# Patient Record
Sex: FEMALE | Race: OTHER | HISPANIC OR LATINO | Employment: FULL TIME | URBAN - METROPOLITAN AREA
[De-identification: names, ages, dates, MRNs, and addresses within clinical notes are randomized per-mention and may not be internally consistent; named-entity substitution may affect disease eponyms.]

---

## 2019-01-22 ENCOUNTER — APPOINTMENT (EMERGENCY)
Dept: RADIOLOGY | Facility: HOSPITAL | Age: 54
End: 2019-01-22
Payer: COMMERCIAL

## 2019-01-22 ENCOUNTER — HOSPITAL ENCOUNTER (EMERGENCY)
Facility: HOSPITAL | Age: 54
Discharge: HOME | End: 2019-01-22
Attending: EMERGENCY MEDICINE | Admitting: EMERGENCY MEDICINE
Payer: COMMERCIAL

## 2019-01-22 ENCOUNTER — APPOINTMENT (EMERGENCY)
Dept: ULTRASOUND IMAGING | Facility: HOSPITAL | Age: 54
End: 2019-01-22
Payer: COMMERCIAL

## 2019-01-22 VITALS
OXYGEN SATURATION: 95 % | SYSTOLIC BLOOD PRESSURE: 199 MMHG | DIASTOLIC BLOOD PRESSURE: 104 MMHG | HEART RATE: 74 BPM | TEMPERATURE: 97.8 F | RESPIRATION RATE: 18 BRPM

## 2019-01-22 DIAGNOSIS — I80.9 SUPERFICIAL THROMBOPHLEBITIS: Primary | ICD-10-CM

## 2019-01-22 PROCEDURE — 76882 US LMTD JT/FCL EVL NVASC XTR: CPT

## 2019-01-22 PROCEDURE — 73610 X-RAY EXAM OF ANKLE: CPT

## 2019-01-22 PROCEDURE — 99284 EMERGENCY DEPT VISIT MOD MDM: CPT

## 2019-01-22 RX ORDER — ASPIRIN 81 MG/1
81 TABLET, CHEWABLE ORAL DAILY
Qty: 30 TABLET | Refills: 0 | Status: SHIPPED | OUTPATIENT
Start: 2019-01-22

## 2019-01-22 RX ORDER — COLCHICINE 0.6 MG/1
1.2 TABLET ORAL ONCE
Status: DISCONTINUED | OUTPATIENT
Start: 2019-01-22 | End: 2019-01-22

## 2019-01-22 RX ORDER — COLCHICINE 0.6 MG/1
0.6 TABLET ORAL ONCE
Status: DISCONTINUED | OUTPATIENT
Start: 2019-01-22 | End: 2019-01-22

## 2019-01-23 NOTE — ED PROVIDER NOTES
History  Chief Complaint   Patient presents with    Leg Pain     Patient c/o right lower leg pain x 2 weeks and increased swelling since this morning, denies injury  Denies hx of DVT  Lee Mcmanus is a 48 y o  female w PMH anxiety, DM, HTN who presents for evaluation of leg pain  Patient complains of pain in the right ankle  BNP few days ago in the foot but now has worsened and spread upward slightly to the ankle medial aspect  She feels a nodule there that is acutely tender  She has no history of DVT/PE, no recent travel, trauma, surgery, no malignancy  Has slight swelling in the ankle  No recent trauma or injury  No numbness or paresthesias  Has trouble walking on a because of painful it is  No history of gout  None       Past Medical History:   Diagnosis Date    Anxiety     Diabetes mellitus ([facility] [address] )     Hypertension        Past Surgical History:   Procedure Laterality Date    CHOLECYSTECTOMY      GASTRIC BYPASS      HERNIA REPAIR      TUBAL LIGATION         History reviewed  No pertinent family history  I have reviewed and agree with the history as documented  Social History   Substance Use Topics    Smoking status: Never Smoker    Smokeless tobacco: Never Used    Alcohol use Yes      Comment: occ        Review of Systems   Constitutional: Negative for chills, diaphoresis and fever  HENT: Negative for congestion and sore throat  Eyes: Negative for visual disturbance  Respiratory: Negative for cough, chest tightness, shortness of breath and wheezing  Cardiovascular: Negative for chest pain and leg swelling  Gastrointestinal: Negative for abdominal pain, constipation, diarrhea, nausea and vomiting  Genitourinary: Negative for difficulty urinating, dysuria, frequency, hematuria, urgency, vaginal bleeding, vaginal discharge and vaginal pain  Musculoskeletal: Positive for myalgias  Negative for arthralgias     Neurological: Negative for dizziness, weakness, light-headedness, numbness and headaches  Psychiatric/Behavioral: The patient is not nervous/anxious  Physical Exam  Physical Exam   Constitutional: She is oriented to person, place, and time  She appears well-developed and well-nourished  No distress  HENT:   Head: Normocephalic and atraumatic  Eyes: Pupils are equal, round, and reactive to light  Neck: Normal range of motion  Neck supple  No tracheal deviation present  Cardiovascular: Normal rate, regular rhythm, normal heart sounds and intact distal pulses  Exam reveals no gallop and no friction rub  No murmur heard  Pulmonary/Chest: Effort normal and breath sounds normal  No respiratory distress  She has no wheezes  She has no rales  Abdominal: Soft  Bowel sounds are normal  She exhibits no distension and no mass  There is no tenderness  There is no guarding  Musculoskeletal: She exhibits no edema or deformity  Neurological: She is alert and oriented to person, place, and time  No cranial nerve deficit  Coordination normal    Skin: Skin is warm and dry  She is not diaphoretic  Patient has pain to palpation to the medial ankle which is slightly erythematous  She does have a nodular that is about 1 cm that is tender to palpation  She has no significant edema, edema is very mild  This does not look consistent with cellulitis  There is no abscess  No pain to palpation in the calf along the deep veins  This nodule and pain she does have is more anterior medial    Psychiatric: She has a normal mood and affect  Her behavior is normal    Nursing note and vitals reviewed        Vital Signs  ED Triage Vitals [01/22/19 1942]   Temperature Pulse Respirations Blood Pressure SpO2   97 8 °F (36 6 °C) 74 18 (!) 199/104 95 %      Temp Source Heart Rate Source Patient Position - Orthostatic VS BP Location FiO2 (%)   Oral Monitor Lying Right arm --      Pain Score       --           Vitals:    01/22/19 1942   BP: (!) 199/104   Pulse: 74   Patient Position - Orthostatic VS: Lying       Visual Acuity      ED Medications  Medications - No data to display    Diagnostic Studies  Results Reviewed     None                 US extremity soft tissue   Final Result by Rebekah Dukes MD (01/22 2306)      Findings consistent with thrombophlebitis at the right medial ankle  Workstation performed: [id number]         XR ankle 3+ views RIGHT   ED Interpretation by Marissa Arias PA-C (01/22 2224)   No acute abnormality                  Procedures  Procedures       Phone Contacts  ED Phone Contact    ED Course                               MDM  Number of Diagnoses or Management Options  Superficial thrombophlebitis:   Diagnosis management comments: DDX includes but not ltd to:   Does not look consistent with cellulitis  Consider about although she does not have history of this  Do not suspect this is acute osseous abnormality  Consider superficial thrombophlebitis, doubt that this is DVT has no pain in the deep venous regions  Doubt that this is arterial vascular insufficiency  Plan is to obtain:  XR of affected joint(s) for acute osseous abnormality   US superficial veins     Based on results:  Superficial thrombophlebitis that will treat this baby aspirin for months and she can follow up with PCP  Advise warm compresses and can use Voltaren cream     Return parameters discussed  Pt requires f/u as an outpt  Pt expresses understanding w above treatment plan  All questions answered prior to d/c  Portions of the record may have been created with voice recognition software   Occasional wrong word or "sound a like" substitutions may have occurred due to the inherent limitations of voice recognition software   Read the chart carefully and recognize, using context, where substitutions have occurred        CritCare Time    Disposition  Final diagnoses:   Superficial thrombophlebitis     Time reflects when diagnosis was documented in both MDM as applicable and the Disposition within this note     Time User Action Codes Description Comment    1/22/2019 10:26 PM Tova Mccoy Add [M25 571] Right ankle pain     1/22/2019 10:57 PM Tova Mccoy Remove [M25 571] Right ankle pain     1/22/2019 10:58 PM Tova Mccoy Add [I80 9] Superficial thrombophlebitis       ED Disposition     ED Disposition Condition Comment    Discharge  Gary Rivas discharge to home/self care  Condition at discharge: Good        Follow-up Information     Follow up With Specialties Details Why Contact Info Additional Information    [address] [facility] Emergency [facility]  If symptoms worsen [address] [address] ED, [address] [place], [place], [address]          Discharge Medication List as of 1/22/2019 11:03 PM      START taking these medications    Details   aspirin 81 mg chewable tablet Chew 1 tablet (81 mg total) daily, Starting Tue 1/22/2019, Print      diclofenac sodium (VOLTAREN) 1 % Apply 2 g topically 4 (four) times a day for 30 days, Starting Tue 1/22/2019, Until Thu 2/21/2019, Print           No discharge procedures on file      ED Provider  Electronically Signed by           Sara Warren PA-C  01/23/19 8663

## 2019-01-23 NOTE — DISCHARGE INSTRUCTIONS
Superficial Thrombophlebitis   WHAT YOU NEED TO KNOW:   Superficial thrombophlebitis (STP) is inflammation of a vein just under your skin (superficial vein)  The inflammation causes a blood clot to form in your vein  STP most often happens in your leg but may also happen in your arm  DISCHARGE INSTRUCTIONS:   Call 911 for any of the following:   · You feel lightheaded, short of breath, and have chest pain  · You cough up blood  Return to the emergency department if:   · Your leg or arm turns pale or blue  · Your leg or arm feels hot or cold  · Your arm or leg feels warm, tender, and painful  It may look swollen and red  Contact your healthcare provider or hematologist if:   · Your symptoms return after treatment  · You have questions or concerns about your condition or care  Medicines: You may  need any of the following:  · Antibiotics  may be given to treat a bacterial infection  · NSAIDs , such as ibuprofen, help decrease swelling, pain, and fever  NSAIDs can cause stomach bleeding or kidney problems in certain people  If you take blood thinner medicine, always ask your healthcare provider if NSAIDs are safe for you  Always read the medicine label and follow directions  · Blood thinners    help prevent blood clots  Examples of blood thinners include heparin and warfarin  Clots can cause strokes, heart attacks, and death  The following are general safety guidelines to follow while you are taking a blood thinner:    ¨ Watch for bleeding and bruising while you take blood thinners  Watch for bleeding from your gums or nose  Watch for blood in your urine and bowel movements  Use a soft washcloth on your skin, and a soft toothbrush to brush your teeth  This can keep your skin and gums from bleeding  If you shave, use an electric shaver  Do not play contact sports  ¨ Tell your dentist and other healthcare providers that you take anticoagulants   Wear a bracelet or necklace that says you take this medicine  ¨ Do not start or stop any medicines unless your healthcare provider tells you to  Many medicines cannot be used with blood thinners  ¨ Tell your healthcare provider right away if you forget to take the medicine, or if you take too much  ¨ Warfarin  is a blood thinner that you may need to take  The following are things you should be aware of if you take warfarin  § Foods and medicines can affect the amount of warfarin in your blood  Do not make major changes to your diet while you take warfarin  Warfarin works best when you eat about the same amount of vitamin K every day  Vitamin K is found in green leafy vegetables and certain other foods  Ask for more information about what to eat when you are taking warfarin  § You will need to see your healthcare provider for follow-up visits when you are on warfarin  You will need regular blood tests  These tests are used to decide how much medicine you need  · Antiplatelets , such as aspirin, help prevent blood clots  Take your antiplatelet medicine exactly as directed  These medicines make it more likely for you to bleed or bruise  If you are told to take aspirin, do not take acetaminophen or ibuprofen instead  · Take your medicine as directed  Contact your healthcare provider if you think your medicine is not helping or if you have side effects  Tell him of her if you are allergic to any medicine  Keep a list of the medicines, vitamins, and herbs you take  Include the amounts, and when and why you take them  Bring the list or the pill bottles to follow-up visits  Carry your medicine list with you in case of an emergency  Follow up with your healthcare provider as directed:  Write down your questions so you remember to ask them during your visits  Manage your symptoms and prevent STP:  STP can increase your risk for a blood clot in deeper veins in your arms or legs  It can also increase your risk for a blood clot in your lungs  Do the following to decrease your risk for more blood clots and manage your symptoms:  · Wear pressure stockings as directed  Pressure stockings improve blood flow and help prevent clots in your legs  Wear the stockings during the day  Do not wear them when you sleep  · Elevate your leg or arm above the level of your heart as often as you can  This will help decrease swelling and pain  Prop your leg or arm on pillows or blankets to keep it elevated comfortably  · Apply a warm compress to your arm or leg  This will help decrease swelling and pain  Wet a washcloth in warm water  Do not  use hot water  Apply the warm compress for 10 minutes  Repeat this 4 times each day  · Maintain a healthy weight  This will help decrease your risk for another blood clot  Ask your healthcare provider how much you should weigh  Ask him or her to help you create a weight loss plan if you are overweight  · Do not smoke  Nicotine and other chemicals in cigarettes and cigars can damage blood vessels and increase your risk for blood clots  Ask your healthcare provider for information if you currently smoke and need help to quit  E-cigarettes or smokeless tobacco still contain nicotine  Talk to your healthcare provider before you use these products  · Stay active  Activity helps prevent blood clots  Do not sit for more than an hour  If you travel by car or work at a desk, move and stretch in your seat several times each hour  In an airplane, get up and walk every hour  Exercise your legs while you are sitting by raising and lowering your heels  Keep your toes on the floor while you do this  You can also raise and lower your toes while keeping your heels on the floor  Also tighten and release your leg muscles while you are sitting  · Exercise regularly  Exercise can help increase your blood flow and prevent a blood clot  Walking is a good low-impact exercise   Talk to your healthcare provider about the best exercise plan for you  · Do not inject illegal drugs  Talk to your healthcare provider if you use IV drugs and need help to quit  © 2017 [employer] Information is for End User's use only and may not be sold, redistributed or otherwise used for commercial purposes  All illustrations and images included in CareNotes® are the copyrighted property of A [employer] M [employer]  or [employer]  The above information is an  only  It is not intended as medical advice for individual conditions or treatments  Talk to your doctor, nurse or pharmacist before following any medical regimen to see if it is safe and effective for you

## 2019-01-29 NOTE — PROGRESS NOTES
Assessment/Plan: Thrombophlebitis of superficial veins of right lower extremity  Right lower extremity superficial thrombophlebitis  -50yo female PMH DM, HTN, varicose veins presented to ED with palpable lump to the right medial ankle; soft tissue U/S +for superficial thrombophlebitis    -continue with compression daily during waking hours  -continue with elevation throughout the day  -continue with warm compress as needed  -Ibuprofen/NSAIDs for pain;  -can continue daily aspirin and voltaren cream to site  -continue with daily activities and walking as tolerated    Discussed superficial thrombophlebitis with the patient and s/s of DVT/PE and when to return to ER or our office for evaluation, she expressed understanding  Will see PRN  Varicose veins of both lower extremities  Bilateral lower extremity varicose veins  -patient denies any symptoms at this time  -she does have a SVT to the right medial ankle, see plan as noted above    We discussed the pathophysiology of varicose veins and venous insufficiency  Patient expressed understanding  Continue conservative measures to aid in symptom relief and progression of disease  PLAN:  -compression stockings 20-30mmHg to be worn during waking hours  -elevate legs throughout the day as able  -exercise daily as tolerated  -continue weight loss and low-fat low-chol diet  -return for f/u PRN if symptoms progress to discuss further treatment options       Diagnoses and all orders for this visit:    Thrombophlebitis of superficial veins of right lower extremity    Varicose veins of both lower extremities, unspecified whether complicated          Subjective:      Patient ID: Sofia Hernandez is a 48 y o  female  Pt is new to our practice and is here for a vein evaluation  Pt was seen in the ED on 1/21/19 and diagnosed with Thrombophlebitis  Pt has pain, a lump and discoloration to her lower right leg    Pt does wear compression stocking and elevates as much as possible  Pt has had no recent testing  Pt is currently taking ASA  Brandi Martin is a 50yo female with PMH of HTN, anxiety, DM, varicose veins, gastric bypass surgery, and obesity  She was referred to our office by Dr Kaylynn Cabrales for evaluation of her superficial thrombophlebitis and varicose veins  Patient reports a palpable lump to the right medial ankle x 1 week  She went to the [facility] ER where they did an Xray and U/S of the soft tissue and found a clot to a superficial vein  She was started on aspirin and voltaren cream for this, as well as compression stockings  She has been wearing compression stockings for several days with good relief of pain to the foot  She is also elevating the leg at night and using a warm compress when able to  She is not taking NSAIDs at this time for the pain  She denies any heaviness, tiredness, aching to the legs, denies itching and burning  She states the area where the clot is hurts when she pushes on it but otherwise she denies any pain at this time  She is a non-smoker  No history of DVT/PE  She does have a FH of blood clots, unsure if DVT or SVT  The following portions of the patient's history were reviewed and updated as appropriate: allergies, current medications, past family history, past medical history, past social history, past surgical history and problem list     Review of Systems   Constitutional: Negative  Negative for chills and fever  HENT: Negative  Eyes: Negative  Respiratory: Negative  Negative for shortness of breath  Cardiovascular: Positive for leg swelling (Right foot)  Negative for chest pain  Gastrointestinal: Negative  Endocrine: Negative  Genitourinary: Negative  Musculoskeletal: Negative  Skin: Positive for color change  Negative for wound  Allergic/Immunologic: Positive for environmental allergies and food allergies  Neurological: Negative  Hematological: Bruises/bleeds easily  Psychiatric/Behavioral: Negative  ROS reviewed and updated  Objective:      BP (!) 182/106 (BP Location: Right arm, Patient Position: Sitting, Cuff Size: Adult)   Pulse 76   Temp 97 6 °F (36 4 °C) (Tympanic)   Resp 16   Ht 5' 3" (1 6 m)   Wt 84 4 kg (186 lb)   BMI 32 95 kg/m²          Physical Exam   Constitutional: She is oriented to person, place, and time  She appears well-developed and well-nourished  HENT:   Head: Normocephalic and atraumatic  Eyes: Pupils are equal, round, and reactive to light  EOM are normal  No scleral icterus  Neck: Normal range of motion  Cardiovascular: Normal rate, regular rhythm, normal heart sounds and intact distal pulses  Pulmonary/Chest: Effort normal and breath sounds normal    Abdominal: Soft  Bowel sounds are normal    Musculoskeletal: Normal range of motion  Neurological: She is alert and oriented to person, place, and time  She has normal strength  Skin: Skin is warm, dry and intact  Bruising noted  Bulging veins to upper thighs  Spider/reticular veins to the legs bilaterally   Psychiatric: She has a normal mood and affect  Her speech is normal and behavior is normal  Judgment and thought content normal  Cognition and memory are normal    Nursing note and vitals reviewed  Vitals:    01/30/19 1045   BP: (!) 182/106   BP Location: Right arm   Patient Position: Sitting   Cuff Size: Adult   Pulse: 76   Resp: 16   Temp: 97 6 °F (36 4 °C)   TempSrc: Tympanic   Weight: 84 4 kg (186 lb)   Height: 5' 3" (1 6 m)       Patient Active Problem List   Diagnosis    Thrombophlebitis of superficial veins of right lower extremity    Varicose veins of both lower extremities       Past Surgical History:   Procedure Laterality Date    CHOLECYSTECTOMY      GASTRIC BYPASS      HERNIA REPAIR      TUBAL LIGATION         History reviewed  No pertinent family history      Social History     Social History    Marital status: Single     Spouse name: N/A  Number of children: N/A    Years of education: N/A     Occupational History    Not on file       Social History Main Topics    Smoking status: Never Smoker    Smokeless tobacco: Never Used    Alcohol use Yes      Comment: occ    Drug use: No    Sexual activity: Not on file     Other Topics Concern    Not on file     Social History Narrative    No narrative on file       Allergies   Allergen Reactions    Anesthesia S-I-40 [Propofol]          Current Outpatient Prescriptions:     aspirin 81 mg chewable tablet, Chew 1 tablet (81 mg total) daily, Disp: 30 tablet, Rfl: 0    diclofenac sodium (VOLTAREN) 1 %, Apply 2 g topically 4 (four) times a day for 30 days, Disp: 1 Tube, Rfl: 0

## 2019-01-30 ENCOUNTER — CONSULT (OUTPATIENT)
Dept: VASCULAR SURGERY | Facility: CLINIC | Age: 54
End: 2019-01-30
Payer: COMMERCIAL

## 2019-01-30 VITALS
RESPIRATION RATE: 16 BRPM | TEMPERATURE: 97.6 F | SYSTOLIC BLOOD PRESSURE: 182 MMHG | BODY MASS INDEX: 32.96 KG/M2 | HEIGHT: 63 IN | DIASTOLIC BLOOD PRESSURE: 106 MMHG | WEIGHT: 186 LBS | HEART RATE: 76 BPM

## 2019-01-30 DIAGNOSIS — I83.93 VARICOSE VEINS OF BOTH LOWER EXTREMITIES, UNSPECIFIED WHETHER COMPLICATED: ICD-10-CM

## 2019-01-30 DIAGNOSIS — I80.01 THROMBOPHLEBITIS OF SUPERFICIAL VEINS OF RIGHT LOWER EXTREMITY: Primary | ICD-10-CM

## 2019-01-30 PROCEDURE — 99243 OFF/OP CNSLTJ NEW/EST LOW 30: CPT | Performed by: NURSE PRACTITIONER

## 2019-01-30 NOTE — ASSESSMENT & PLAN NOTE
Bilateral lower extremity varicose veins  -patient denies any symptoms at this time  -she does have a SVT to the right medial ankle, see plan as noted above    We discussed the pathophysiology of varicose veins and venous insufficiency  Patient expressed understanding  Continue conservative measures to aid in symptom relief and progression of disease      PLAN:  -compression stockings 20-30mmHg to be worn during waking hours  -elevate legs throughout the day as able  -exercise daily as tolerated  -continue weight loss and low-fat low-chol diet  -return for f/u PRN if symptoms progress to discuss further treatment options

## 2019-01-30 NOTE — LETTER
January 30, 2019     Patient: Lio Warner   YOB: 1965   Date of Visit: 1/30/2019       To Whom it May Concern:    Lio Warner is under my professional care  She was seen in my office on 1/30/2019  She may return to work on 1/30/19 without restrictions  If you have any questions or concerns, please don't hesitate to call           Sincerely,          RANDY Macias        CC: Lio Alana

## 2019-01-30 NOTE — LETTER
January 30, 2019     Gio Rutherford MD  [address] [facility] [address]    Patient: Colten Whitt   YOB: 1965   Date of Visit: 1/30/2019       Dear Dr Nora Prabhakar: Thank you for referring Colten Whitt to me for evaluation  Below are my notes for this consultation  If you have questions, please do not hesitate to call me  I look forward to following your patient along with you           Sincerely,        RANDY Spaulding        CC: No Recipients

## 2019-01-30 NOTE — PATIENT INSTRUCTIONS
You have superficial thrombophlebitis, a blood clot to a superficial vein  This is not life threatening  Continue with compression stockings daily from the time you wake up until bedtime  Continue with warm compress to the site as you are able  Continue aspirin daily  Elevate the legs throughout the day as able  If you have any increase in symptoms to the leg, or experience any shortness of breath, chest pain, heart palpitations, please return to the ER for evaluation  Peripheral Vascular Disease   AMBULATORY CARE:   Peripheral vascular disease (PVD)  is a condition that causes decreased blood flow to your limbs because of blocked blood vessels  The blockage is usually caused by atherosclerosis  This is when material, such as cholesterol, sticks to the inside of your blood vessels and makes them narrow  Common symptoms include the following:   · Painful cramps in your hip, thigh, or calf muscles, especially after you walk or climb stairs    · Burning pain in your hands, fingers, feet, or toes    · Shiny, tight, cold skin, and uneven hair growth on your skin    · A change in your skin color    · Sores on your skin that do not heal    · Weakness or numbness of your hands or feet  Call 911 for any of the following:   · You have any of the following signs of a heart attack:      ¨ Squeezing, pressure, or pain in your chest that lasts longer than 5 minutes or returns    ¨ Discomfort or pain in your back, neck, jaw, stomach, or arm     ¨ Trouble breathing    ¨ Nausea or vomiting    ¨ Lightheadedness or a sudden cold sweat, especially with chest pain or trouble breathing    · You have any of the following signs of a stroke:      ¨ Numbness or drooping on one side of your face     ¨ Weakness in an arm or leg    ¨ Confusion or difficulty speaking    ¨ Dizziness, a severe headache, or vision loss  Seek care immediately if:   · Your arm or leg feels warm, tender, and painful  It may look swollen and red      · You have pain in your legs that does not go away with rest     · You have dark areas on the skin of your legs  · You cannot see out of one or both of your eyes  Contact your healthcare provider if:   · Your signs and symptoms get worse or do not get better, even after treatment  · You have a sore or ulcer that is not healing or gets worse  · You have questions or concerns about your condition or care  Treatment for PVD  may include any of the following:  · Medicines  may be given to help decrease your cholesterol level, open blood vessels, or prevent blood clots  · Procedures  may be used to open blocked blood vessels  Metal or plastic stents (tubes) may be put in where the artery was blocked to keep it open  Surgery may be used to place a new blood vessel near the one that is blocked, or replace the area of the blood vessel  Manage PVD:   · Do not smoke  Nicotine and other chemicals in cigarettes and cigars can damage your blood vessels  Ask your healthcare provider for information if you currently smoke and need help to quit  E-cigarettes or smokeless tobacco still contain nicotine  Talk to your healthcare provider before you use these products  · Get regular exercise  Ask about the best exercise plan for you  Walking is a low-impact way to exercise and increase your blood flow  Stop and rest if you have pain in your legs  · Care for your feet  Look closely at your feet every day  Check for cracks or sores  Wash your feet daily with mild soap and dry them well  Do not walk barefoot in case you step on a hard or sharp object  · Change your sleep position  You may have pain in your legs or feet when you sleep  Raise the head of your bed 4 inches, or use pillows to prop your upper body higher than your legs  This may help more blood go to your feet, decreasing pain  · Protect and cushion your feet and hands  If you have ulcers on your feet, you may need to wear bandages with heel pads  You may also wear foam rubber booties  Hand or foot warmers may decrease pain in your hands or feet  Prevent PVD:   · Eat a variety of healthy foods  Healthy foods include fruits, vegetables, whole-grain breads, low-fat dairy products, beans, lean meats, and fish  Ask if you need to be on a special diet  · Maintain a healthy weight  Ask your healthcare provider how much you should weigh  Ask him to help you create a weight loss plan if you are overweight  · Manage diabetes  Keep your blood sugar level in the correct range  Check your blood sugar level often  Ask your healthcare provider if you should make changes to your diet, exercise, or medications  Follow up with your healthcare provider as directed:  Write down your questions so you remember to ask them during your visits  © 2017 [employer] Information is for End User's use only and may not be sold, redistributed or otherwise used for commercial purposes  All illustrations and images included in CareNotes® are the copyrighted property of [employer]  or [employer]  The above information is an  only  It is not intended as medical advice for individual conditions or treatments  Talk to your doctor, nurse or pharmacist before following any medical regimen to see if it is safe and effective for you

## 2019-01-30 NOTE — ASSESSMENT & PLAN NOTE
Right lower extremity superficial thrombophlebitis  -50yo female PMH DM, HTN, varicose veins presented to ED with palpable lump to the right medial ankle; soft tissue U/S +for superficial thrombophlebitis    -continue with compression daily during waking hours  -continue with elevation throughout the day  -continue with warm compress as needed  -Ibuprofen/NSAIDs for pain;  -can continue daily aspirin and voltaren cream to site  -continue with daily activities and walking as tolerated    Discussed superficial thrombophlebitis with the patient and s/s of DVT/PE and when to return to ER or our office for evaluation, she expressed understanding  Will see PRN

## 2019-04-09 ENCOUNTER — APPOINTMENT (EMERGENCY)
Dept: CT IMAGING | Facility: HOSPITAL | Age: 54
End: 2019-04-09
Payer: COMMERCIAL

## 2019-04-09 ENCOUNTER — HOSPITAL ENCOUNTER (EMERGENCY)
Facility: HOSPITAL | Age: 54
Discharge: HOME | End: 2019-04-09
Attending: EMERGENCY MEDICINE | Admitting: EMERGENCY MEDICINE
Payer: COMMERCIAL

## 2019-04-09 VITALS
WEIGHT: 180 LBS | HEART RATE: 55 BPM | RESPIRATION RATE: 21 BRPM | SYSTOLIC BLOOD PRESSURE: 179 MMHG | DIASTOLIC BLOOD PRESSURE: 88 MMHG | TEMPERATURE: 97.8 F | OXYGEN SATURATION: 98 % | BODY MASS INDEX: 31.89 KG/M2

## 2019-04-09 DIAGNOSIS — I10 HYPERTENSION: Primary | ICD-10-CM

## 2019-04-09 LAB
ALBUMIN SERPL BCP-MCNC: 3.3 G/DL (ref 3.5–5)
ALP SERPL-CCNC: 116 U/L (ref 46–116)
ALT SERPL W P-5'-P-CCNC: 30 U/L (ref 12–78)
ANION GAP SERPL CALCULATED.3IONS-SCNC: 7 MMOL/L (ref 4–13)
AST SERPL W P-5'-P-CCNC: 28 U/L (ref 5–45)
ATRIAL RATE: 54 BPM
BASOPHILS # BLD AUTO: 0.05 THOUSANDS/ΜL (ref 0–0.1)
BASOPHILS NFR BLD AUTO: 1 % (ref 0–1)
BILIRUB SERPL-MCNC: 0.4 MG/DL (ref 0.2–1)
BUN SERPL-MCNC: 14 MG/DL (ref 5–25)
CALCIUM SERPL-MCNC: 8.7 MG/DL (ref 8.3–10.1)
CHLORIDE SERPL-SCNC: 105 MMOL/L (ref 100–108)
CO2 SERPL-SCNC: 27 MMOL/L (ref 21–32)
CREAT SERPL-MCNC: 0.69 MG/DL (ref 0.6–1.3)
EOSINOPHIL # BLD AUTO: 0.09 THOUSAND/ΜL (ref 0–0.61)
EOSINOPHIL NFR BLD AUTO: 2 % (ref 0–6)
ERYTHROCYTE [DISTWIDTH] IN BLOOD BY AUTOMATED COUNT: 14.2 % (ref 11.6–15.1)
GFR SERPL CREATININE-BSD FRML MDRD: 99 ML/MIN/1.73SQ M
GLUCOSE SERPL-MCNC: 79 MG/DL (ref 65–140)
HCT VFR BLD AUTO: 37.8 % (ref 34.8–46.1)
HGB BLD-MCNC: 12 G/DL (ref 11.5–15.4)
IMM GRANULOCYTES # BLD AUTO: 0.01 THOUSAND/UL (ref 0–0.2)
IMM GRANULOCYTES NFR BLD AUTO: 0 % (ref 0–2)
LYMPHOCYTES # BLD AUTO: 1.84 THOUSANDS/ΜL (ref 0.6–4.47)
LYMPHOCYTES NFR BLD AUTO: 41 % (ref 14–44)
MCH RBC QN AUTO: 26.1 PG (ref 26.8–34.3)
MCHC RBC AUTO-ENTMCNC: 31.7 G/DL (ref 31.4–37.4)
MCV RBC AUTO: 82 FL (ref 82–98)
MONOCYTES # BLD AUTO: 0.47 THOUSAND/ΜL (ref 0.17–1.22)
MONOCYTES NFR BLD AUTO: 11 % (ref 4–12)
NEUTROPHILS # BLD AUTO: 1.99 THOUSANDS/ΜL (ref 1.85–7.62)
NEUTS SEG NFR BLD AUTO: 45 % (ref 43–75)
NRBC BLD AUTO-RTO: 0 /100 WBCS
P AXIS: 65 DEGREES
PLATELET # BLD AUTO: 225 THOUSANDS/UL (ref 149–390)
PMV BLD AUTO: 11.5 FL (ref 8.9–12.7)
POTASSIUM SERPL-SCNC: 4.5 MMOL/L (ref 3.5–5.3)
PR INTERVAL: 162 MS
PROT SERPL-MCNC: 6.9 G/DL (ref 6.4–8.2)
QRS AXIS: 44 DEGREES
QRSD INTERVAL: 82 MS
QT INTERVAL: 458 MS
QTC INTERVAL: 434 MS
RBC # BLD AUTO: 4.59 MILLION/UL (ref 3.81–5.12)
SODIUM SERPL-SCNC: 139 MMOL/L (ref 136–145)
T WAVE AXIS: 7 DEGREES
TROPONIN I SERPL-MCNC: <0.02 NG/ML
VENTRICULAR RATE: 54 BPM
WBC # BLD AUTO: 4.45 THOUSAND/UL (ref 4.31–10.16)

## 2019-04-09 PROCEDURE — 84484 ASSAY OF TROPONIN QUANT: CPT | Performed by: EMERGENCY MEDICINE

## 2019-04-09 PROCEDURE — 85025 COMPLETE CBC W/AUTO DIFF WBC: CPT | Performed by: EMERGENCY MEDICINE

## 2019-04-09 PROCEDURE — 80053 COMPREHEN METABOLIC PANEL: CPT | Performed by: EMERGENCY MEDICINE

## 2019-04-09 PROCEDURE — 93010 ELECTROCARDIOGRAM REPORT: CPT | Performed by: INTERNAL MEDICINE

## 2019-04-09 PROCEDURE — 36415 COLL VENOUS BLD VENIPUNCTURE: CPT | Performed by: EMERGENCY MEDICINE

## 2019-04-09 PROCEDURE — 99284 EMERGENCY DEPT VISIT MOD MDM: CPT | Performed by: EMERGENCY MEDICINE

## 2019-04-09 PROCEDURE — 93005 ELECTROCARDIOGRAM TRACING: CPT

## 2019-04-09 PROCEDURE — 99284 EMERGENCY DEPT VISIT MOD MDM: CPT

## 2019-04-09 PROCEDURE — 70450 CT HEAD/BRAIN W/O DYE: CPT

## 2019-04-09 RX ORDER — LISINOPRIL 5 MG/1
5 TABLET ORAL DAILY
Qty: 30 TABLET | Refills: 0 | Status: SHIPPED | OUTPATIENT
Start: 2019-04-09 | End: 2019-04-09 | Stop reason: SDUPTHER

## 2019-04-09 RX ORDER — LISINOPRIL 10 MG/1
10 TABLET ORAL ONCE
Status: COMPLETED | OUTPATIENT
Start: 2019-04-09 | End: 2019-04-09

## 2019-04-09 RX ORDER — LISINOPRIL 5 MG/1
5 TABLET ORAL DAILY
Qty: 30 TABLET | Refills: 0 | Status: SHIPPED | OUTPATIENT
Start: 2019-04-09 | End: 2021-06-04 | Stop reason: HOSPADM

## 2019-04-09 RX ORDER — LOSARTAN POTASSIUM 50 MG/1
100 TABLET ORAL ONCE
Status: COMPLETED | OUTPATIENT
Start: 2019-04-09 | End: 2019-04-09

## 2019-04-09 RX ADMIN — LOSARTAN POTASSIUM 100 MG: 50 TABLET, FILM COATED ORAL at 16:51

## 2019-04-09 RX ADMIN — LISINOPRIL 10 MG: 10 TABLET ORAL at 17:01

## 2019-05-15 ENCOUNTER — TELEPHONE (OUTPATIENT)
Dept: NEUROLOGY | Facility: CLINIC | Age: 54
End: 2019-05-15

## 2019-05-21 ENCOUNTER — TRANSCRIBE ORDERS (OUTPATIENT)
Dept: NEUROLOGY | Facility: CLINIC | Age: 54
End: 2019-05-21

## 2019-05-21 DIAGNOSIS — I10 ESSENTIAL (PRIMARY) HYPERTENSION: Primary | ICD-10-CM

## 2019-05-21 DIAGNOSIS — R42 DIZZINESS AND GIDDINESS: ICD-10-CM

## 2019-05-23 ENCOUNTER — TRANSCRIBE ORDERS (OUTPATIENT)
Dept: NEUROLOGY | Facility: CLINIC | Age: 54
End: 2019-05-23

## 2019-05-23 ENCOUNTER — CONSULT (OUTPATIENT)
Dept: NEUROLOGY | Facility: CLINIC | Age: 54
End: 2019-05-23
Payer: COMMERCIAL

## 2019-05-23 VITALS
HEART RATE: 58 BPM | BODY MASS INDEX: 34.27 KG/M2 | SYSTOLIC BLOOD PRESSURE: 164 MMHG | WEIGHT: 186.2 LBS | DIASTOLIC BLOOD PRESSURE: 84 MMHG | HEIGHT: 62 IN

## 2019-05-23 DIAGNOSIS — R20.0 NUMBNESS AND TINGLING: ICD-10-CM

## 2019-05-23 DIAGNOSIS — R42 DIZZINESS AND GIDDINESS: Primary | ICD-10-CM

## 2019-05-23 DIAGNOSIS — R20.2 NUMBNESS AND TINGLING: ICD-10-CM

## 2019-05-23 PROCEDURE — 99244 OFF/OP CNSLTJ NEW/EST MOD 40: CPT | Performed by: PSYCHIATRY & NEUROLOGY

## 2019-05-23 RX ORDER — BUDESONIDE AND FORMOTEROL FUMARATE DIHYDRATE 160; 4.5 UG/1; UG/1
2 AEROSOL RESPIRATORY (INHALATION) 2 TIMES DAILY
COMMUNITY

## 2019-06-12 ENCOUNTER — PROCEDURE VISIT (OUTPATIENT)
Dept: NEUROLOGY | Facility: CLINIC | Age: 54
End: 2019-06-12
Payer: COMMERCIAL

## 2019-06-12 DIAGNOSIS — R20.0 NUMBNESS AND TINGLING: ICD-10-CM

## 2019-06-12 DIAGNOSIS — R20.2 NUMBNESS AND TINGLING: ICD-10-CM

## 2019-06-12 PROCEDURE — 95886 MUSC TEST DONE W/N TEST COMP: CPT | Performed by: PHYSICAL MEDICINE & REHABILITATION

## 2019-06-12 PROCEDURE — 95911 NRV CNDJ TEST 9-10 STUDIES: CPT | Performed by: PHYSICAL MEDICINE & REHABILITATION

## 2019-06-13 DIAGNOSIS — G56.03 BILATERAL CARPAL TUNNEL SYNDROME: Primary | ICD-10-CM

## 2019-06-24 ENCOUNTER — HOSPITAL ENCOUNTER (OUTPATIENT)
Dept: ULTRASOUND IMAGING | Facility: CLINIC | Age: 54
Discharge: HOME | End: 2019-06-24
Payer: COMMERCIAL

## 2019-06-24 ENCOUNTER — HOSPITAL ENCOUNTER (OUTPATIENT)
Dept: MRI IMAGING | Facility: CLINIC | Age: 54
Discharge: HOME | End: 2019-06-24
Payer: COMMERCIAL

## 2019-06-24 DIAGNOSIS — R20.0 NUMBNESS AND TINGLING: ICD-10-CM

## 2019-06-24 DIAGNOSIS — R42 DIZZINESS AND GIDDINESS: ICD-10-CM

## 2019-06-24 DIAGNOSIS — R20.2 NUMBNESS AND TINGLING: ICD-10-CM

## 2019-06-24 PROCEDURE — 93880 EXTRACRANIAL BILAT STUDY: CPT | Performed by: SURGERY

## 2019-06-24 PROCEDURE — 70551 MRI BRAIN STEM W/O DYE: CPT

## 2019-06-24 PROCEDURE — 93880 EXTRACRANIAL BILAT STUDY: CPT

## 2019-08-19 ENCOUNTER — OFFICE VISIT (OUTPATIENT)
Dept: NEUROLOGY | Facility: CLINIC | Age: 54
End: 2019-08-19
Payer: COMMERCIAL

## 2019-08-19 VITALS
DIASTOLIC BLOOD PRESSURE: 80 MMHG | SYSTOLIC BLOOD PRESSURE: 120 MMHG | HEIGHT: 61 IN | WEIGHT: 186 LBS | BODY MASS INDEX: 35.12 KG/M2 | HEART RATE: 72 BPM

## 2019-08-19 DIAGNOSIS — G56.03 BILATERAL CARPAL TUNNEL SYNDROME: Primary | ICD-10-CM

## 2019-08-19 PROCEDURE — 99213 OFFICE O/P EST LOW 20 MIN: CPT | Performed by: PSYCHIATRY & NEUROLOGY

## 2019-08-19 RX ORDER — CHLORTHALIDONE 50 MG/1
50 TABLET ORAL DAILY
COMMUNITY

## 2019-08-19 NOTE — PROGRESS NOTES
Sheron Yeager is a 47 y o  female  Chief Complaint   Patient presents with    Numbness       Assessment:  1  Bilateral carpal tunnel syndrome          Discussion:  Patient's recent MRI scan of the brain and carotid ultrasound was unremarkable, his EMG of bilateral upper extremity showed that she has moderate carpal tunnel syndrome, I advised the patient to continue with her wrist splints, she is unable to go for physical therapy secondary to her work schedule, I advised her to avoid driving for long distances and to avoid repetitive activities with her hands that can trigger her symptoms, I would have her see a hand surgeon to evaluate for her carpal tunnel syndrome and also advised her to follow up with them regarding her shoulder pain and knee pain, she was advised to keep her blood pressure cholesterol and sugar under control, to go to the hospital if has any worsening symptoms and call me otherwise to see me back in 3 months and follow up with other physicians  Subjective:    HPI   Patient is here in follow-up for numbness and pain and tingling in both hands left worse than the right, she has numbness and tingling in the right hand and pain in the left thumb, especially when she is driving and she has to shake her hands, she denies having any headache dizziness of blurriness of the vision although symptoms have resolved, there is no motor or sensory symptoms in lower extremities, no speech difficulty, no neck or back pain, she does complain of left shoulder pain and right knee pain, no other complaints      Vitals:    08/19/19 1515   BP: 120/80   BP Location: Left arm   Patient Position: Sitting   Cuff Size: Large   Pulse: 72   Weight: 84 4 kg (186 lb)   Height: 5' 1" (1 549 m)       Current Medications    Current Outpatient Medications:     budesonide-formoterol (SYMBICORT) 160-4 5 mcg/act inhaler, Inhale 2 puffs 2 (two) times a day Rinse mouth after use , Disp: , Rfl:     chlorthalidone (HYGROTEN) 50 MG tablet, Take 50 mg by mouth daily, Disp: , Rfl:     lisinopril (ZESTRIL) 5 mg tablet, Take 1 tablet (5 mg total) by mouth daily for 30 days (Patient taking differently: Take 10 mg by mouth daily ), Disp: 30 tablet, Rfl: 0    aspirin 81 mg chewable tablet, Chew 1 tablet (81 mg total) daily (Patient not taking: Reported on 8/19/2019), Disp: 30 tablet, Rfl: 0      Allergies  Anesthesia [id number] [propofol]    Past Medical History  Past Medical History:   Diagnosis Date    Asthma     Diabetes mellitus ([facility] [address] )     Hypertension          Past Surgical History:  Past Surgical History:   Procedure Laterality Date    CHOLECYSTECTOMY      GASTRIC BYPASS      HERNIA REPAIR      TUBAL LIGATION           Family History:  Family History   Problem Relation Age of Onset    Hypertension Mother    Maureen Irene Arthritis Mother     Diabetes Father     Hypertension Father     Diabetes Sister     Hypertension Sister     Diabetes Brother     Hypertension Brother     Hypertension Sister     Diabetes Sister        Social History:   reports that she has never smoked  She has never used smokeless tobacco  She reports that she drank alcohol  She reports that she does not use drugs  I have reviewed the past medical history, surgical history, social and family history, current medications, allergies vitals, review of systems, and updated this information as appropriate today  Objective:    Physical Exam    Neurological Exam    GENERAL:  Cooperative in no acute distress  Well-developed and well-nourished    HEAD and NECK   Head is atraumatic normocephalic with no lesions or masses  Neck is supple with full range of motion    CARDIOVASCULAR  Carotid Arteries-no carotid bruits  NEUROLOGIC:  Mental Status-the patient is awake alert and oriented without aphasia or apraxia  Cranial Nerves: Visual fields are full to confrontation    Extraocular movements are full without nystagmus  Pupils are 2-1/2 mm and reactive   Face is symmetrical to light touch  Movements of facial expression move symmetrically  Hearing is normal to finger rub bilaterally  Soft palate lifts symmetrically  Shoulder shrug is symmetrical  Tongue is midline without atrophy  Motor: No drift is noted on arm extension  Strength is full in the upper and lower extremities with normal bulk and tone  Sensory: Intact to temperature and vibratory sensation in the upper and lower extremities bilaterally  Cortical function is intact  Coordination: Finger to nose testing is performed accurately  Gait reveals a normal base with symmetrical arm swing  Reflexes:  1+ and symmetric  Positive Tinel sign at bilateral wrist joints  No spine tenderness          ROS:  Review of Systems   Constitutional: Negative  Negative for appetite change and fever  HENT: Negative  Negative for hearing loss, tinnitus, trouble swallowing and voice change  Eyes: Negative  Negative for photophobia and pain  Respiratory: Negative  Negative for shortness of breath  Cardiovascular: Negative  Negative for palpitations  Gastrointestinal: Negative  Negative for nausea and vomiting  Endocrine: Negative  Negative for cold intolerance and heat intolerance  Genitourinary: Negative  Negative for dysuria, frequency and urgency  Musculoskeletal: Negative for arthralgias, myalgias and neck pain  Skin: Negative  Negative for rash  Neurological: Positive for numbness  Negative for dizziness, tremors, seizures, syncope, facial asymmetry, speech difficulty, weakness, light-headedness and headaches  Patient states that she still have bilateral knee pain and numbness and tingling in bilateral hand  Hematological: Negative  Does not bruise/bleed easily  Psychiatric/Behavioral: Negative  Negative for confusion, hallucinations and sleep disturbance

## 2019-11-06 ENCOUNTER — TELEPHONE (OUTPATIENT)
Dept: NEUROLOGY | Facility: CLINIC | Age: 54
End: 2019-11-06

## 2019-11-06 NOTE — TELEPHONE ENCOUNTER
Patient requesting her EMG results be sent to ortho (Dr Romaine Burnett referred)  She didn't remember the name of the doctor, said it's [place] [facility]  She only had a phone number and said to ask to be transferred to ortho when they answer  I attempted to call the number she provided x 2 but it just continuously rang  The number she provided was [phone]  I faxed the EMG results to [phone]

## 2020-09-27 ENCOUNTER — HOSPITAL ENCOUNTER (EMERGENCY)
Facility: HOSPITAL | Age: 55
Discharge: HOME | End: 2020-09-28
Attending: EMERGENCY MEDICINE | Admitting: EMERGENCY MEDICINE
Payer: COMMERCIAL

## 2020-09-27 VITALS
TEMPERATURE: 97.9 F | HEART RATE: 60 BPM | BODY MASS INDEX: 37.5 KG/M2 | DIASTOLIC BLOOD PRESSURE: 107 MMHG | RESPIRATION RATE: 18 BRPM | SYSTOLIC BLOOD PRESSURE: 205 MMHG | HEIGHT: 63 IN | WEIGHT: 211.64 LBS | OXYGEN SATURATION: 99 %

## 2020-09-27 DIAGNOSIS — M79.675 TOE PAIN, LEFT: Primary | ICD-10-CM

## 2020-09-27 PROCEDURE — 99283 EMERGENCY DEPT VISIT LOW MDM: CPT

## 2020-09-28 ENCOUNTER — APPOINTMENT (EMERGENCY)
Dept: RADIOLOGY | Facility: HOSPITAL | Age: 55
End: 2020-09-28
Payer: COMMERCIAL

## 2020-09-28 PROBLEM — M79.674 TOE PAIN, RIGHT: Status: RESOLVED | Noted: 2020-09-28 | Resolved: 2020-09-28

## 2020-09-28 PROBLEM — M79.674 TOE PAIN, RIGHT: Status: ACTIVE | Noted: 2020-09-28

## 2020-09-28 PROBLEM — M79.675 TOE PAIN, LEFT: Status: ACTIVE | Noted: 2020-09-28

## 2020-09-28 PROCEDURE — 99284 EMERGENCY DEPT VISIT MOD MDM: CPT | Performed by: PHYSICIAN ASSISTANT

## 2020-09-28 PROCEDURE — 73630 X-RAY EXAM OF FOOT: CPT

## 2020-09-28 RX ORDER — INDOMETHACIN 25 MG/1
50 CAPSULE ORAL ONCE
Status: COMPLETED | OUTPATIENT
Start: 2020-09-28 | End: 2020-09-28

## 2020-09-28 RX ORDER — INDOMETHACIN 50 MG/1
50 CAPSULE ORAL 2 TIMES DAILY WITH MEALS
Qty: 20 CAPSULE | Refills: 0 | Status: SHIPPED | OUTPATIENT
Start: 2020-09-28

## 2020-09-28 RX ADMIN — INDOMETHACIN 50 MG: 25 CAPSULE ORAL at 00:37

## 2021-06-03 ENCOUNTER — HOSPITAL ENCOUNTER (INPATIENT)
Facility: HOSPITAL | Age: 56
LOS: 1 days | Discharge: HOME | DRG: 066 | End: 2021-06-04
Attending: EMERGENCY MEDICINE | Admitting: HOSPITALIST
Payer: COMMERCIAL

## 2021-06-03 ENCOUNTER — APPOINTMENT (EMERGENCY)
Dept: CT IMAGING | Facility: HOSPITAL | Age: 56
DRG: 066 | End: 2021-06-03
Payer: COMMERCIAL

## 2021-06-03 ENCOUNTER — APPOINTMENT (EMERGENCY)
Dept: RADIOLOGY | Facility: HOSPITAL | Age: 56
DRG: 066 | End: 2021-06-03
Payer: COMMERCIAL

## 2021-06-03 DIAGNOSIS — R20.0 RIGHT SIDED NUMBNESS: Primary | ICD-10-CM

## 2021-06-03 DIAGNOSIS — I66.21 OCCLUSION AND STENOSIS OF RIGHT POSTERIOR CEREBRAL ARTERY: ICD-10-CM

## 2021-06-03 DIAGNOSIS — I10 HYPERTENSION: ICD-10-CM

## 2021-06-03 DIAGNOSIS — I63.9 LEFT THALAMIC INFARCTION (HCC): ICD-10-CM

## 2021-06-03 DIAGNOSIS — I66.22 OCCLUSION AND STENOSIS OF LEFT POSTERIOR CEREBRAL ARTERY: ICD-10-CM

## 2021-06-03 DIAGNOSIS — T14.8XXA EXTRAVASATION INJURY: ICD-10-CM

## 2021-06-03 DIAGNOSIS — K21.9 GERD (GASTROESOPHAGEAL REFLUX DISEASE): ICD-10-CM

## 2021-06-03 PROBLEM — I16.9 HYPERTENSIVE CRISIS: Status: ACTIVE | Noted: 2021-06-03

## 2021-06-03 LAB
ALBUMIN SERPL BCP-MCNC: 3.4 G/DL (ref 3.5–5)
ALP SERPL-CCNC: 119 U/L (ref 46–116)
ALT SERPL W P-5'-P-CCNC: 25 U/L (ref 12–78)
ANION GAP SERPL CALCULATED.3IONS-SCNC: 5 MMOL/L (ref 4–13)
AST SERPL W P-5'-P-CCNC: 21 U/L (ref 5–45)
ATRIAL RATE: 57 BPM
BASOPHILS # BLD AUTO: 0.05 THOUSANDS/ΜL (ref 0–0.1)
BASOPHILS NFR BLD AUTO: 1 % (ref 0–1)
BILIRUB SERPL-MCNC: 0.23 MG/DL (ref 0.2–1)
BILIRUB UR QL STRIP: NEGATIVE
BUN SERPL-MCNC: 15 MG/DL (ref 5–25)
CALCIUM ALBUM COR SERPL-MCNC: 9 MG/DL (ref 8.3–10.1)
CALCIUM SERPL-MCNC: 8.5 MG/DL (ref 8.3–10.1)
CHLORIDE SERPL-SCNC: 107 MMOL/L (ref 100–108)
CLARITY UR: CLEAR
CO2 SERPL-SCNC: 30 MMOL/L (ref 21–32)
COLOR UR: YELLOW
CREAT SERPL-MCNC: 0.91 MG/DL (ref 0.6–1.3)
EOSINOPHIL # BLD AUTO: 0.18 THOUSAND/ΜL (ref 0–0.61)
EOSINOPHIL NFR BLD AUTO: 4 % (ref 0–6)
ERYTHROCYTE [DISTWIDTH] IN BLOOD BY AUTOMATED COUNT: 14.9 % (ref 11.6–15.1)
GFR SERPL CREATININE-BSD FRML MDRD: 71 ML/MIN/1.73SQ M
GLUCOSE SERPL-MCNC: 102 MG/DL (ref 65–140)
GLUCOSE UR STRIP-MCNC: NEGATIVE MG/DL
HCT VFR BLD AUTO: 39.8 % (ref 34.8–46.1)
HGB BLD-MCNC: 12.6 G/DL (ref 11.5–15.4)
HGB UR QL STRIP.AUTO: NEGATIVE
IMM GRANULOCYTES # BLD AUTO: 0.01 THOUSAND/UL (ref 0–0.2)
IMM GRANULOCYTES NFR BLD AUTO: 0 % (ref 0–2)
KETONES UR STRIP-MCNC: NEGATIVE MG/DL
LEUKOCYTE ESTERASE UR QL STRIP: NEGATIVE
LYMPHOCYTES # BLD AUTO: 2.52 THOUSANDS/ΜL (ref 0.6–4.47)
LYMPHOCYTES NFR BLD AUTO: 53 % (ref 14–44)
MCH RBC QN AUTO: 25.8 PG (ref 26.8–34.3)
MCHC RBC AUTO-ENTMCNC: 31.7 G/DL (ref 31.4–37.4)
MCV RBC AUTO: 82 FL (ref 82–98)
MONOCYTES # BLD AUTO: 0.5 THOUSAND/ΜL (ref 0.17–1.22)
MONOCYTES NFR BLD AUTO: 10 % (ref 4–12)
NEUTROPHILS # BLD AUTO: 1.55 THOUSANDS/ΜL (ref 1.85–7.62)
NEUTS SEG NFR BLD AUTO: 32 % (ref 43–75)
NITRITE UR QL STRIP: NEGATIVE
NRBC BLD AUTO-RTO: 0 /100 WBCS
P AXIS: 42 DEGREES
PH UR STRIP.AUTO: 5.5 [PH]
PLATELET # BLD AUTO: 229 THOUSANDS/UL (ref 149–390)
PMV BLD AUTO: 11.2 FL (ref 8.9–12.7)
POTASSIUM SERPL-SCNC: 4 MMOL/L (ref 3.5–5.3)
PR INTERVAL: 166 MS
PROT SERPL-MCNC: 7 G/DL (ref 6.4–8.2)
PROT UR STRIP-MCNC: NEGATIVE MG/DL
QRS AXIS: 38 DEGREES
QRSD INTERVAL: 78 MS
QT INTERVAL: 462 MS
QTC INTERVAL: 449 MS
RBC # BLD AUTO: 4.88 MILLION/UL (ref 3.81–5.12)
SODIUM SERPL-SCNC: 142 MMOL/L (ref 136–145)
SP GR UR STRIP.AUTO: <=1.005 (ref 1–1.03)
T WAVE AXIS: 1 DEGREES
TROPONIN I SERPL-MCNC: <0.02 NG/ML
UROBILINOGEN UR QL STRIP.AUTO: 0.2 E.U./DL
VENTRICULAR RATE: 57 BPM
WBC # BLD AUTO: 4.81 THOUSAND/UL (ref 4.31–10.16)

## 2021-06-03 PROCEDURE — 85025 COMPLETE CBC W/AUTO DIFF WBC: CPT | Performed by: EMERGENCY MEDICINE

## 2021-06-03 PROCEDURE — 81003 URINALYSIS AUTO W/O SCOPE: CPT | Performed by: EMERGENCY MEDICINE

## 2021-06-03 PROCEDURE — 93005 ELECTROCARDIOGRAM TRACING: CPT

## 2021-06-03 PROCEDURE — 70496 CT ANGIOGRAPHY HEAD: CPT

## 2021-06-03 PROCEDURE — 99285 EMERGENCY DEPT VISIT HI MDM: CPT

## 2021-06-03 PROCEDURE — 80053 COMPREHEN METABOLIC PANEL: CPT | Performed by: EMERGENCY MEDICINE

## 2021-06-03 PROCEDURE — 84484 ASSAY OF TROPONIN QUANT: CPT | Performed by: EMERGENCY MEDICINE

## 2021-06-03 PROCEDURE — 93010 ELECTROCARDIOGRAM REPORT: CPT | Performed by: INTERNAL MEDICINE

## 2021-06-03 PROCEDURE — 36415 COLL VENOUS BLD VENIPUNCTURE: CPT | Performed by: EMERGENCY MEDICINE

## 2021-06-03 PROCEDURE — 99285 EMERGENCY DEPT VISIT HI MDM: CPT | Performed by: EMERGENCY MEDICINE

## 2021-06-03 PROCEDURE — 71045 X-RAY EXAM CHEST 1 VIEW: CPT

## 2021-06-03 PROCEDURE — 70498 CT ANGIOGRAPHY NECK: CPT

## 2021-06-03 PROCEDURE — 99223 1ST HOSP IP/OBS HIGH 75: CPT | Performed by: HOSPITALIST

## 2021-06-03 RX ORDER — ASPIRIN 81 MG/1
81 TABLET, CHEWABLE ORAL DAILY
Status: DISCONTINUED | OUTPATIENT
Start: 2021-06-04 | End: 2021-06-04 | Stop reason: HOSPADM

## 2021-06-03 RX ORDER — CLOPIDOGREL BISULFATE 75 MG/1
75 TABLET ORAL DAILY
Status: DISCONTINUED | OUTPATIENT
Start: 2021-06-04 | End: 2021-06-04 | Stop reason: HOSPADM

## 2021-06-03 RX ORDER — ATORVASTATIN CALCIUM 40 MG/1
80 TABLET, FILM COATED ORAL EVERY EVENING
Status: DISCONTINUED | OUTPATIENT
Start: 2021-06-04 | End: 2021-06-04

## 2021-06-03 RX ORDER — ASPIRIN 325 MG
325 TABLET ORAL ONCE
Status: COMPLETED | OUTPATIENT
Start: 2021-06-03 | End: 2021-06-03

## 2021-06-03 RX ORDER — BUDESONIDE AND FORMOTEROL FUMARATE DIHYDRATE 160; 4.5 UG/1; UG/1
2 AEROSOL RESPIRATORY (INHALATION) 2 TIMES DAILY
Status: DISCONTINUED | OUTPATIENT
Start: 2021-06-03 | End: 2021-06-04 | Stop reason: HOSPADM

## 2021-06-03 RX ORDER — LABETALOL 20 MG/4 ML (5 MG/ML) INTRAVENOUS SYRINGE
20 ONCE
Status: DISCONTINUED | OUTPATIENT
Start: 2021-06-03 | End: 2021-06-04 | Stop reason: HOSPADM

## 2021-06-03 RX ORDER — CLOPIDOGREL BISULFATE 75 MG/1
75 TABLET ORAL DAILY
Status: DISCONTINUED | OUTPATIENT
Start: 2021-06-04 | End: 2021-06-03

## 2021-06-03 RX ORDER — HYDRALAZINE HYDROCHLORIDE 20 MG/ML
5 INJECTION INTRAMUSCULAR; INTRAVENOUS EVERY 4 HOURS PRN
Status: DISCONTINUED | OUTPATIENT
Start: 2021-06-03 | End: 2021-06-04 | Stop reason: HOSPADM

## 2021-06-03 RX ORDER — ATORVASTATIN CALCIUM 40 MG/1
40 TABLET, FILM COATED ORAL EVERY EVENING
Status: DISCONTINUED | OUTPATIENT
Start: 2021-06-03 | End: 2021-06-03

## 2021-06-03 RX ORDER — LISINOPRIL 10 MG/1
10 TABLET ORAL DAILY
Status: DISCONTINUED | OUTPATIENT
Start: 2021-06-04 | End: 2021-06-04 | Stop reason: HOSPADM

## 2021-06-03 RX ORDER — CHLORTHALIDONE 25 MG/1
50 TABLET ORAL DAILY
Status: DISCONTINUED | OUTPATIENT
Start: 2021-06-04 | End: 2021-06-04 | Stop reason: HOSPADM

## 2021-06-03 RX ORDER — CLOPIDOGREL BISULFATE 75 MG/1
300 TABLET ORAL ONCE
Status: COMPLETED | OUTPATIENT
Start: 2021-06-03 | End: 2021-06-03

## 2021-06-03 RX ORDER — PANTOPRAZOLE SODIUM 40 MG/1
40 TABLET, DELAYED RELEASE ORAL
Status: DISCONTINUED | OUTPATIENT
Start: 2021-06-04 | End: 2021-06-04 | Stop reason: HOSPADM

## 2021-06-03 RX ADMIN — ASPIRIN 325 MG ORAL TABLET 325 MG: 325 PILL ORAL at 19:06

## 2021-06-03 RX ADMIN — IOHEXOL 100 ML: 350 INJECTION, SOLUTION INTRAVENOUS at 18:07

## 2021-06-03 RX ADMIN — CLOPIDOGREL BISULFATE 300 MG: 75 TABLET ORAL at 21:09

## 2021-06-03 RX ADMIN — ATORVASTATIN CALCIUM 40 MG: 40 TABLET, FILM COATED ORAL at 19:58

## 2021-06-03 NOTE — ED PROVIDER NOTES
History  Chief Complaint   Patient presents with    Numbness     Pt reports numbness of her right side X2 days  Patient is a 41-year-old female  She is a diabetic and has hypertension  She is status post gastric bypass  She has no history of neurologic disease  She presents to the emergency room complaining of numbness to her right arm and right leg, as well as right face  This started 2 days ago and has been fairly constant  There is no associated weakness or paralysis  No visual problems  No speech problems  No prior history of stroke  No history of multiple sclerosis  Symptoms are moderate in severity  No aggravating or relieving factors  Patient received her COVID vaccination at the beginning of May  She reports that since then, she overall has not felt well  Prior to Admission Medications   Prescriptions Last Dose Informant Patient Reported? Taking?   aspirin 81 mg chewable tablet  Self No No   Sig: Chew 1 tablet (81 mg total) daily   Patient not taking: Reported on 8/19/2019   budesonide-formoterol (SYMBICORT) 160-4 5 mcg/act inhaler  Self Yes No   Sig: Inhale 2 puffs 2 (two) times a day Rinse mouth after use     chlorthalidone (HYGROTEN) 50 MG tablet   Yes No   Sig: Take 50 mg by mouth daily   indomethacin (INDOCIN) 50 mg capsule   No No   Sig: Take 1 capsule (50 mg total) by mouth 2 (two) times a day with meals   lisinopril (ZESTRIL) 5 mg tablet  Self No No   Sig: Take 1 tablet (5 mg total) by mouth daily for 30 days   Patient taking differently: Take 10 mg by mouth daily       Facility-Administered Medications: None       Past Medical History:   Diagnosis Date    Asthma     Diabetes mellitus ([facility] [address] )     Hypertension        Past Surgical History:   Procedure Laterality Date    CHOLECYSTECTOMY      GASTRIC BYPASS      HERNIA REPAIR      TUBAL LIGATION         Family History   Problem Relation Age of Onset    Hypertension Mother    Nina Gaxiola Arthritis Mother     Diabetes Father    Nina Gaxiola Hypertension Father     Diabetes Sister     Hypertension Sister     Diabetes Brother     Hypertension Brother     Hypertension Sister     Diabetes Sister      I have reviewed and agree with the history as documented  E-Cigarette/Vaping     E-Cigarette/Vaping Substances     Social History     Tobacco Use    Smoking status: Never Smoker    Smokeless tobacco: Never Used   Substance Use Topics    Alcohol use: Not Currently     Frequency: Never     Comment: occ    Drug use: No       Review of Systems   Constitutional: Negative for chills and fever  HENT: Negative for rhinorrhea and sore throat  Eyes: Negative for pain, redness and visual disturbance  Respiratory: Negative for cough and shortness of breath  Cardiovascular: Negative for chest pain and leg swelling  Gastrointestinal: Negative for abdominal pain, diarrhea and vomiting  Endocrine: Negative for polydipsia and polyuria  Genitourinary: Negative for dysuria, frequency, hematuria, vaginal bleeding and vaginal discharge  Musculoskeletal: Negative for back pain and neck pain  Skin: Negative for rash and wound  Allergic/Immunologic: Negative for immunocompromised state  Neurological: Positive for numbness  Negative for weakness and headaches  Hematological: Does not bruise/bleed easily  Psychiatric/Behavioral: Negative for hallucinations and suicidal ideas  All other systems reviewed and are negative  Physical Exam  Physical Exam  Vitals signs reviewed  Constitutional:       General: She is not in acute distress  Appearance: She is obese  HENT:      Head: Normocephalic and atraumatic  Nose: Nose normal       Mouth/Throat:      Mouth: Mucous membranes are moist    Eyes:      General:         Right eye: No discharge  Left eye: No discharge  Conjunctiva/sclera: Conjunctivae normal    Neck:      Musculoskeletal: Normal range of motion and neck supple  No neck rigidity     Cardiovascular:      Rate and Rhythm: Normal rate and regular rhythm  Pulses: Normal pulses  Heart sounds: Normal heart sounds  No murmur  No friction rub  No gallop  Pulmonary:      Effort: Pulmonary effort is normal  No respiratory distress  Breath sounds: Normal breath sounds  No stridor  No wheezing, rhonchi or rales  Abdominal:      General: Bowel sounds are normal  There is no distension  Palpations: Abdomen is soft  Tenderness: There is no abdominal tenderness  There is no right CVA tenderness, left CVA tenderness, guarding or rebound  Musculoskeletal: Normal range of motion  General: No swelling, tenderness, deformity or signs of injury  Right lower leg: No edema  Left lower leg: No edema  Comments: No calf tenderness or unilateral leg swelling  Skin:     General: Skin is warm and dry  Coloration: Skin is not jaundiced  Findings: No rash  Neurological:      General: No focal deficit present  Mental Status: She is alert and oriented to person, place, and time  Cranial Nerves: No cranial nerve deficit  Sensory: No sensory deficit  Motor: Motor function is intact        Coordination: Coordination normal    Psychiatric:         Mood and Affect: Mood normal          Behavior: Behavior normal          Vital Signs  ED Triage Vitals [06/03/21 1514]   Temperature Pulse Respirations Blood Pressure SpO2   97 7 °F (36 5 °C) 63 22 (!) 215/98 100 %      Temp Source Heart Rate Source Patient Position - Orthostatic VS BP Location FiO2 (%)   Oral Monitor -- Left arm --      Pain Score       --           Vitals:    06/03/21 1514 06/03/21 1630 06/03/21 1645 06/03/21 1900   BP: (!) 215/98 (!) 187/91 154/86 (!) 197/94   Pulse: 63 66 66 (!) 53         Visual Acuity  Visual Acuity      Most Recent Value   L Pupil Size (mm)  3   R Pupil Size (mm)  3          ED Medications  Medications   Labetalol HCl (NORMODYNE) injection 20 mg (0 mg Intravenous Hold 6/3/21 1103) iohexol (OMNIPAQUE) 350 MG/ML injection (SINGLE-DOSE) 100 mL (100 mL Intravenous Given 6/3/21 1807)   aspirin tablet 325 mg (325 mg Oral Given 6/3/21 1906)       Diagnostic Studies  Results Reviewed     Procedure Component Value Units Date/Time    UA (URINE) with reflex to Scope [[id number]] Collected: 06/03/21 1900    Lab Status: No result Specimen: Urine, Clean Catch     Troponin I [[id number]]  (Normal) Collected: 06/03/21 1631    Lab Status: Final result Specimen: Blood from Arm, Right Updated: 06/03/21 1656     Troponin I <0 02 ng/mL     Comprehensive metabolic panel [[id number]]  (Abnormal) Collected: 06/03/21 1631    Lab Status: Final result Specimen: Blood from Arm, Right Updated: 06/03/21 1654     Sodium 142 mmol/L      Potassium 4 0 mmol/L      Chloride 107 mmol/L      CO2 30 mmol/L      ANION GAP 5 mmol/L      BUN 15 mg/dL      Creatinine 0 91 mg/dL      Glucose 102 mg/dL      Calcium 8 5 mg/dL      Corrected Calcium 9 0 mg/dL      AST 21 U/L      ALT 25 U/L      Alkaline Phosphatase 119 U/L      Total Protein 7 0 g/dL      Albumin 3 4 g/dL      Total Bilirubin 0 23 mg/dL      eGFR 71 ml/min/1 73sq m     Narrative:      [place] guidelines for Chronic Kidney Disease (CKD):     Stage 1 with normal or high GFR (GFR > 90 mL/min/1 73 square meters)    Stage 2 Mild CKD (GFR = 60-89 mL/min/1 73 square meters)    Stage 3A Moderate CKD (GFR = 45-59 mL/min/1 73 square meters)    Stage 3B Moderate CKD (GFR = 30-44 mL/min/1 73 square meters)    Stage 4 Severe CKD (GFR = 15-29 mL/min/1 73 square meters)    Stage 5 End Stage CKD (GFR <15 mL/min/1 73 square meters)  Note: GFR calculation is accurate only with a steady state creatinine    CBC and differential [[id number]]  (Abnormal) Collected: 06/03/21 1631    Lab Status: Final result Specimen: Blood from Arm, Right Updated: 06/03/21 1640     WBC 4 81 Thousand/uL      RBC 4 88 Million/uL      Hemoglobin 12 6 g/dL      Hematocrit 39 8 % MCV 82 fL      MCH 25 8 pg      MCHC 31 7 g/dL      RDW 14 9 %      MPV 11 2 fL      Platelets 732 Thousands/uL      nRBC 0 /100 WBCs      Neutrophils Relative 32 %      Immat GRANS % 0 %      Lymphocytes Relative 53 %      Monocytes Relative 10 %      Eosinophils Relative 4 %      Basophils Relative 1 %      Neutrophils Absolute 1 55 Thousands/µL      Immature Grans Absolute 0 01 Thousand/uL      Lymphocytes Absolute 2 52 Thousands/µL      Monocytes Absolute 0 50 Thousand/µL      Eosinophils Absolute 0 18 Thousand/µL      Basophils Absolute 0 05 Thousands/µL                  CTA head and neck with and without contrast   Final Result by Sheela Lyons MD (06/03 1838)      No evidence of acute intracranial hemorrhage  Microangiopathy  Right P2 segment high-grade 5 mm length critical stenosis/occlusion  Long segment left P2 segment occlusion  Consider follow-up noncontrast brain MRI  I personally discussed this study with Aline Zavala on 6/3/2021 at 6:37 PM                                Workstation performed: [id number]         XR chest 1 view portable   ED Interpretation by West Kim MD (06/03 1708)   No infiltrates  No CHF                   Procedures  ECG 12 Lead Documentation Only    Date/Time: 6/3/2021 4:56 PM  Performed by: West Kim MD  Authorized by: West Kim MD     ECG reviewed by me, the ED Provider: yes    Patient location:  ED  Interpretation:     Interpretation: normal    Rate:     ECG rate assessment: bradycardic    Rhythm:     Rhythm: sinus rhythm    Ectopy:     Ectopy: none    QRS:     QRS axis:  Normal  Conduction:     Conduction: normal    ST segments:     ST segments:  Normal  T waves:     T waves: normal               ED Course                 Stroke Assessment     Row Name 06/03/21 1077             NIH Stroke Scale    Interval  Baseline      Level of Consciousness (1a )  0      LOC Questions (1b )  0      LOC Commands (1c )  0      Best Gaze (2 ) 0      Visual (3 )  0      Facial Palsy (4 )  0      Motor Arm, Left (5a )  0      Motor Arm, Right (5b )  0      Motor Leg, Left (6a )  0      Motor Leg, Right (6b )  0      Limb Ataxia (7 )  0      Sensory (8 )  0      Best Language (9 )  0      Dysarthria (10 )  0      Extinction and Inattention (11 ) (Formerly Neglect)  0      Total  0          First Filed Value   TPA Decision  Patient not a TPA candidate  Patient is not a candidate options  comment [Out of tPA window, low NIH stroke scale]                  SBIRT 20yo+      Most Recent Value   SBIRT (24 yo +)   In order to provide better care to our patients, we are screening all of our patients for alcohol and drug use  Would it be okay to ask you these screening questions? No Filed at: 06/03/2021 1822                    [facility]  Number of Diagnoses or Management Options  Diagnosis management comments: NIH stroke scale was 0  Patient subjectively has numbness but not appreciated on physical examination  CT of the head shows occlusion of the posterior cerebral arteries bilaterally  Patient has no visual complaints  No vertigo  This is of unclear significance and would not explain patient's symptoms  Does, however, suggest that she has significant arterial disease  She was very hypertensive on arrival   This improved spontaneously  She is a diabetic  She had contrast extravasation in the right arm, this will need to be watched  Consulted with hospitalist for admission for stroke pathway           Amount and/or Complexity of Data Reviewed  Clinical lab tests: ordered and reviewed  Tests in the radiology section of CPT®: ordered and reviewed  Discuss the patient with other providers: yes  Independent visualization of images, tracings, or specimens: yes        Disposition  Final diagnoses:   Right sided numbness   Extravasation injury - Contrast right arm   Hypertension     Time reflects when diagnosis was documented in both MDM as applicable and the Disposition within this note     Time User Action Codes Description Comment    6/3/2021  6:49 PM Pepper Rust Add [R20 0] Right sided numbness     6/3/2021  6:49 PM Roy, [address] [facility]  8XXA] Extravasation injury     6/3/2021  6:49 PM Maria Esther, [address]  8XXA] Extravasation injury Contrast right arm    6/3/2021  6:49 PM Pepper Rust Add [I10] Hypertension       ED Disposition     ED Disposition Condition Date/Time Comment    Admit Stable Thu Medhat 3, 2021  6:49 PM         Follow-up Information    None         Patient's Medications   Discharge Prescriptions    No medications on file     No discharge procedures on file      PDMP Review     None          ED Provider  Electronically Signed by           Carley Ronquillo MD  06/03/21 Jasen Santos MD  06/03/21 [id number]

## 2021-06-03 NOTE — ED PROVIDER NOTES
History  Chief Complaint   Patient presents with    Numbness     Pt reports numbness of her right side X2 days  HPI    Prior to Admission Medications   Prescriptions Last Dose Informant Patient Reported? Taking?   aspirin 81 mg chewable tablet  Self No No   Sig: Chew 1 tablet (81 mg total) daily   Patient not taking: Reported on 8/19/2019   budesonide-formoterol (SYMBICORT) 160-4 5 mcg/act inhaler  Self Yes No   Sig: Inhale 2 puffs 2 (two) times a day Rinse mouth after use  chlorthalidone (HYGROTEN) 50 MG tablet   Yes No   Sig: Take 50 mg by mouth daily   indomethacin (INDOCIN) 50 mg capsule   No No   Sig: Take 1 capsule (50 mg total) by mouth 2 (two) times a day with meals   lisinopril (ZESTRIL) 5 mg tablet  Self No No   Sig: Take 1 tablet (5 mg total) by mouth daily for 30 days   Patient taking differently: Take 10 mg by mouth daily       Facility-Administered Medications: None       Past Medical History:   Diagnosis Date    Asthma     [facility]     Hypertension        Past Surgical History:   Procedure Laterality Date    CHOLECYSTECTOMY      GASTRIC BYPASS      HERNIA REPAIR      TUBAL LIGATION         Family History   Problem Relation Age of Onset    Hypertension Mother    Exie Dutton Arthritis Mother     Diabetes Father     Hypertension Father     Diabetes Sister     Hypertension Sister     Diabetes Brother     Hypertension Brother     Hypertension Sister     Diabetes Sister      I have reviewed and agree with the history as documented      E-Cigarette/Vaping     E-Cigarette/Vaping Substances     Social History     Tobacco Use    Smoking status: Never Smoker    Smokeless tobacco: Never Used   Substance Use Topics    Alcohol use: Not Currently     Frequency: Never     Comment: occ    Drug use: No       Review of Systems    Physical Exam  Physical Exam    Vital Signs  ED Triage Vitals [06/03/21 1514]   Temperature Pulse Respirations Blood Pressure SpO2   97 7 °F (36 5 °C) 63 22 (!) 215/98 100 %      Temp Source Heart Rate Source Patient Position - Orthostatic VS BP Location FiO2 (%)   Oral Monitor -- Left arm --      Pain Score       --           Vitals:    06/03/21 1514 06/03/21 1630 06/03/21 1645 06/03/21 1900   BP: (!) 215/98 (!) 187/91 154/86 (!) 197/94   Pulse: 63 66 66 (!) 53         Visual Acuity  Visual Acuity      Most Recent Value   L Pupil Size (mm)  3   R Pupil Size (mm)  3          ED Medications  Medications   Labetalol HCl (NORMODYNE) injection 20 mg (0 mg Intravenous Hold 6/3/21 1648)   iohexol (OMNIPAQUE) 350 MG/ML injection (SINGLE-DOSE) 100 mL (100 mL Intravenous Given 6/3/21 1807)   aspirin tablet 325 mg (325 mg Oral Given 6/3/21 1906)       Diagnostic Studies  Results Reviewed     Procedure Component Value Units Date/Time    UA (URINE) with reflex to Scope [[id number]] Collected: 06/03/21 1900    Lab Status: Final result Specimen: Urine, Clean Catch Updated: 06/03/21 1914     Color, UA Yellow     Clarity, UA Clear     Specific Gravity, UA <=1 005     pH, UA 5 5     Leukocytes, UA Negative     Nitrite, UA Negative     Protein, UA Negative mg/dl      Glucose, UA Negative mg/dl      Ketones, UA Negative mg/dl      Urobilinogen, UA 0 2 E U /dl      Bilirubin, UA Negative     Blood, UA Negative    Troponin I [[id number]]  (Normal) Collected: 06/03/21 1631    Lab Status: Final result Specimen: Blood from Arm, Right Updated: 06/03/21 1656     Troponin I <0 02 ng/mL     Comprehensive metabolic panel [[id number]]  (Abnormal) Collected: 06/03/21 1631    Lab Status: Final result Specimen: Blood from Arm, Right Updated: 06/03/21 1654     Sodium 142 mmol/L      Potassium 4 0 mmol/L      Chloride 107 mmol/L      CO2 30 mmol/L      ANION GAP 5 mmol/L      BUN 15 mg/dL      Creatinine 0 91 mg/dL      Glucose 102 mg/dL      Calcium 8 5 mg/dL      Corrected Calcium 9 0 mg/dL      AST 21 U/L      ALT 25 U/L      Alkaline Phosphatase 119 U/L      Total Protein 7 0 g/dL      Albumin 3 4 g/dL Total Bilirubin 0 23 mg/dL      eGFR 71 ml/min/1 73sq m     Narrative:      National Kid[employer] guidelines for Chronic Kidney Disease (CKD):     Stage 1 with normal or high GFR (GFR > 90 mL/min/1 73 square meters)    Stage 2 Mild CKD (GFR = 60-89 mL/min/1 73 square meters)    Stage 3A Moderate CKD (GFR = 45-59 mL/min/1 73 square meters)    Stage 3B Moderate CKD (GFR = 30-44 mL/min/1 73 square meters)    Stage 4 Severe CKD (GFR = 15-29 mL/min/1 73 square meters)    Stage 5 End Stage CKD (GFR <15 mL/min/1 73 square meters)  Note: GFR calculation is accurate only with a steady state creatinine    CBC and differential [[id number]]  (Abnormal) Collected: 06/03/21 1631    Lab Status: Final result Specimen: Blood from Arm, Right Updated: 06/03/21 1640     WBC 4 81 Thousand/uL      RBC 4 88 Million/uL      Hemoglobin 12 6 g/dL      Hematocrit 39 8 %      MCV 82 fL      MCH 25 8 pg      MCHC 31 7 g/dL      RDW 14 9 %      MPV 11 2 fL      Platelets 951 Thousands/uL      nRBC 0 /100 WBCs      Neutrophils Relative 32 %      Immat GRANS % 0 %      Lymphocytes Relative 53 %      Monocytes Relative 10 %      Eosinophils Relative 4 %      Basophils Relative 1 %      Neutrophils Absolute 1 55 Thousands/µL      Immature Grans Absolute 0 01 Thousand/uL      Lymphocytes Absolute 2 52 Thousands/µL      Monocytes Absolute 0 50 Thousand/µL      Eosinophils Absolute 0 18 Thousand/µL      Basophils Absolute 0 05 Thousands/µL                  CTA head and neck with and without contrast   Final Result by Jmi Cuellar MD (06/03 1838)      No evidence of acute intracranial hemorrhage  Microangiopathy  Right P2 segment high-grade 5 mm length critical stenosis/occlusion  Long segment left P2 segment occlusion  Consider follow-up noncontrast brain MRI               I personally discussed this study with Felisha Meeks on 6/3/2021 at 6:37 PM                                Workstation performed: [id number] XR chest 1 view portable   ED Interpretation by Star Qiu MD (06/03 1708)   No infiltrates  No CHF  Procedures  Procedures         ED Course  ED Course as of Jun 03 1932   Thu Jun 03, 2021   6081 Patient was seen by me in sign-out, reviewed the patient's CT scan findings, discuss this with their surgery and with Neurology and neurology's recommendation is a blood pressure goal of [phone] systolic, aspirin 490 mg, Plavix 300 mg and admission to the stroke pathway here  Neurosurgery had no input                    Stroke Assessment     Row Name 06/03/21 1656             NIH Stroke Scale    Interval  Baseline      Level of Consciousness (1a )  0      LOC Questions (1b )  0      LOC Commands (1c )  0      Best Gaze (2 )  0      Visual (3 )  0      Facial Palsy (4 )  0      Motor Arm, Left (5a )  0      Motor Arm, Right (5b )  0      Motor Leg, Left (6a )  0      Motor Leg, Right (6b )  0      Limb Ataxia (7 )  0      Sensory (8 )  0      Best Language (9 )  0      Dysarthria (10 )  0      Extinction and Inattention (11 ) (Formerly Neglect)  0      Total  0          First Filed Value   TPA Decision  Patient not a TPA candidate  Patient is not a candidate options  comment [Out of tPA window, low NIH stroke scale]                  SBIRT 22yo+      Most Recent Value   SBIRT (24 yo +)   In order to provide better care to our patients, we are screening all of our patients for alcohol and drug use  Would it be okay to ask you these screening questions? No Filed at: 06/03/2021 5506                    MDM    Disposition  Final diagnoses:   Right sided numbness   Extravasation injury - Contrast right arm   Hypertension     Time reflects when diagnosis was documented in both MDM as applicable and the Disposition within this note     Time User Action Codes Description Comment    6/3/2021  6:49 PM Pushpa Brown Add [R20 0] Right sided numbness     6/3/2021  6:49 PM Maria Esther, [address] [place] [address]  Deneen Leyden Extravasation injury     6/3/2021  6:49 PM Maria Esther, [address]  8XXA] Extravasation injury Contrast right arm    6/3/2021  6:49 PM Mala Mcclellan Add [I10] Hypertension       ED Disposition     ED Disposition Condition Date/Time Comment    Admit Stable Thu Med[place] 3, 2021 1849         Follow-up Information    None         Patient's Medications   Discharge Prescriptions    No medications on file     No discharge procedures on file      PDMP Review     None          ED Provider  Electronically Signed by           Devon Hernandez DO  06/21/21 9049

## 2021-06-04 ENCOUNTER — TELEPHONE (OUTPATIENT)
Dept: INTERVENTIONAL RADIOLOGY/VASCULAR | Facility: HOSPITAL | Age: 56
End: 2021-06-04

## 2021-06-04 ENCOUNTER — APPOINTMENT (INPATIENT)
Dept: NON INVASIVE DIAGNOSTICS | Facility: HOSPITAL | Age: 56
DRG: 066 | End: 2021-06-04
Payer: COMMERCIAL

## 2021-06-04 ENCOUNTER — APPOINTMENT (INPATIENT)
Dept: MRI IMAGING | Facility: HOSPITAL | Age: 56
DRG: 066 | End: 2021-06-04
Payer: COMMERCIAL

## 2021-06-04 VITALS
TEMPERATURE: 98.4 F | RESPIRATION RATE: 16 BRPM | OXYGEN SATURATION: 98 % | SYSTOLIC BLOOD PRESSURE: 133 MMHG | WEIGHT: 211 LBS | HEIGHT: 63 IN | DIASTOLIC BLOOD PRESSURE: 77 MMHG | BODY MASS INDEX: 37.39 KG/M2 | HEART RATE: 83 BPM

## 2021-06-04 PROBLEM — I63.81 LEFT THALAMIC INFARCTION (HCC): Status: ACTIVE | Noted: 2021-06-04

## 2021-06-04 PROBLEM — I63.9 LEFT THALAMIC INFARCTION (HCC): Status: ACTIVE | Noted: 2021-06-04

## 2021-06-04 PROBLEM — I10 HYPERTENSION: Status: ACTIVE | Noted: 2021-06-03

## 2021-06-04 LAB
ALBUMIN SERPL BCP-MCNC: 3.2 G/DL (ref 3.5–5)
ALP SERPL-CCNC: 113 U/L (ref 46–116)
ALT SERPL W P-5'-P-CCNC: 20 U/L (ref 12–78)
ANION GAP SERPL CALCULATED.3IONS-SCNC: 11 MMOL/L (ref 4–13)
AST SERPL W P-5'-P-CCNC: 15 U/L (ref 5–45)
BILIRUB DIRECT SERPL-MCNC: 0.11 MG/DL (ref 0–0.2)
BILIRUB SERPL-MCNC: 0.31 MG/DL (ref 0.2–1)
BUN SERPL-MCNC: 10 MG/DL (ref 5–25)
CALCIUM SERPL-MCNC: 8.7 MG/DL (ref 8.3–10.1)
CHLORIDE SERPL-SCNC: 104 MMOL/L (ref 100–108)
CHOLEST SERPL-MCNC: 174 MG/DL (ref 50–200)
CO2 SERPL-SCNC: 26 MMOL/L (ref 21–32)
CREAT SERPL-MCNC: 0.67 MG/DL (ref 0.6–1.3)
ERYTHROCYTE [DISTWIDTH] IN BLOOD BY AUTOMATED COUNT: 14.9 % (ref 11.6–15.1)
EST. AVERAGE GLUCOSE BLD GHB EST-MCNC: 137 MG/DL
GFR SERPL CREATININE-BSD FRML MDRD: 99 ML/MIN/1.73SQ M
GLUCOSE SERPL-MCNC: 111 MG/DL (ref 65–140)
HBA1C MFR BLD: 6.4 %
HCT VFR BLD AUTO: 39.3 % (ref 34.8–46.1)
HDLC SERPL-MCNC: 75 MG/DL
HGB BLD-MCNC: 12.6 G/DL (ref 11.5–15.4)
LDLC SERPL CALC-MCNC: 82 MG/DL (ref 0–100)
MAGNESIUM SERPL-MCNC: 2.2 MG/DL (ref 1.6–2.6)
MCH RBC QN AUTO: 25.8 PG (ref 26.8–34.3)
MCHC RBC AUTO-ENTMCNC: 32.1 G/DL (ref 31.4–37.4)
MCV RBC AUTO: 80 FL (ref 82–98)
PLATELET # BLD AUTO: 222 THOUSANDS/UL (ref 149–390)
PMV BLD AUTO: 11.1 FL (ref 8.9–12.7)
POTASSIUM SERPL-SCNC: 3.4 MMOL/L (ref 3.5–5.3)
PROT SERPL-MCNC: 6.5 G/DL (ref 6.4–8.2)
RBC # BLD AUTO: 4.89 MILLION/UL (ref 3.81–5.12)
SODIUM SERPL-SCNC: 141 MMOL/L (ref 136–145)
TRIGL SERPL-MCNC: 84 MG/DL
WBC # BLD AUTO: 3.91 THOUSAND/UL (ref 4.31–10.16)

## 2021-06-04 PROCEDURE — 70551 MRI BRAIN STEM W/O DYE: CPT

## 2021-06-04 PROCEDURE — 93306 TTE W/DOPPLER COMPLETE: CPT

## 2021-06-04 PROCEDURE — 83036 HEMOGLOBIN GLYCOSYLATED A1C: CPT | Performed by: HOSPITALIST

## 2021-06-04 PROCEDURE — 97165 OT EVAL LOW COMPLEX 30 MIN: CPT

## 2021-06-04 PROCEDURE — 80061 LIPID PANEL: CPT | Performed by: HOSPITALIST

## 2021-06-04 PROCEDURE — G1004 CDSM NDSC: HCPCS

## 2021-06-04 PROCEDURE — NC001 PR NO CHARGE: Performed by: NURSE PRACTITIONER

## 2021-06-04 PROCEDURE — 80048 BASIC METABOLIC PNL TOTAL CA: CPT | Performed by: HOSPITALIST

## 2021-06-04 PROCEDURE — 93306 TTE W/DOPPLER COMPLETE: CPT | Performed by: INTERNAL MEDICINE

## 2021-06-04 PROCEDURE — 99223 1ST HOSP IP/OBS HIGH 75: CPT | Performed by: PSYCHIATRY & NEUROLOGY

## 2021-06-04 PROCEDURE — 83735 ASSAY OF MAGNESIUM: CPT | Performed by: HOSPITALIST

## 2021-06-04 PROCEDURE — 99447 NTRPROF PH1/NTRNET/EHR 11-20: CPT | Performed by: NURSE PRACTITIONER

## 2021-06-04 PROCEDURE — 97161 PT EVAL LOW COMPLEX 20 MIN: CPT

## 2021-06-04 PROCEDURE — 80076 HEPATIC FUNCTION PANEL: CPT | Performed by: HOSPITALIST

## 2021-06-04 PROCEDURE — 85027 COMPLETE CBC AUTOMATED: CPT | Performed by: HOSPITALIST

## 2021-06-04 PROCEDURE — 99239 HOSP IP/OBS DSCHRG MGMT >30: CPT | Performed by: NURSE PRACTITIONER

## 2021-06-04 RX ORDER — POTASSIUM CHLORIDE 20 MEQ/1
40 TABLET, EXTENDED RELEASE ORAL ONCE
Status: COMPLETED | OUTPATIENT
Start: 2021-06-04 | End: 2021-06-04

## 2021-06-04 RX ORDER — CLOPIDOGREL BISULFATE 75 MG/1
75 TABLET ORAL DAILY
Qty: 30 TABLET | Refills: 0 | Status: SHIPPED | OUTPATIENT
Start: 2021-06-05 | End: 2021-07-05

## 2021-06-04 RX ORDER — PANTOPRAZOLE SODIUM 40 MG/1
40 TABLET, DELAYED RELEASE ORAL
Qty: 30 TABLET | Refills: 0 | Status: SHIPPED | OUTPATIENT
Start: 2021-06-05 | End: 2021-07-05

## 2021-06-04 RX ORDER — ATORVASTATIN CALCIUM 40 MG/1
40 TABLET, FILM COATED ORAL EVERY EVENING
Status: DISCONTINUED | OUTPATIENT
Start: 2021-06-04 | End: 2021-06-04 | Stop reason: HOSPADM

## 2021-06-04 RX ORDER — ATORVASTATIN CALCIUM 40 MG/1
40 TABLET, FILM COATED ORAL EVERY EVENING
Qty: 30 TABLET | Refills: 0 | Status: SHIPPED | OUTPATIENT
Start: 2021-06-04 | End: 2021-07-04

## 2021-06-04 RX ORDER — LISINOPRIL 10 MG/1
10 TABLET ORAL DAILY
Qty: 30 TABLET | Refills: 0 | Status: SHIPPED | OUTPATIENT
Start: 2021-06-05 | End: 2021-07-05

## 2021-06-04 RX ADMIN — POTASSIUM CHLORIDE 40 MEQ: 1500 TABLET, EXTENDED RELEASE ORAL at 08:47

## 2021-06-04 RX ADMIN — BUDESONIDE AND FORMOTEROL FUMARATE DIHYDRATE 2 PUFF: 160; 4.5 AEROSOL RESPIRATORY (INHALATION) at 08:47

## 2021-06-04 RX ADMIN — ASPIRIN 81 MG: 81 TABLET, CHEWABLE ORAL at 08:47

## 2021-06-04 RX ADMIN — CHLORTHALIDONE 50 MG: 25 TABLET ORAL at 08:48

## 2021-06-04 RX ADMIN — ENOXAPARIN SODIUM 40 MG: 40 INJECTION SUBCUTANEOUS at 08:47

## 2021-06-04 RX ADMIN — HYDRALAZINE HYDROCHLORIDE 5 MG: 20 INJECTION INTRAMUSCULAR; INTRAVENOUS at 01:23

## 2021-06-04 RX ADMIN — CLOPIDOGREL BISULFATE 75 MG: 75 TABLET ORAL at 08:47

## 2021-06-04 RX ADMIN — LISINOPRIL 10 MG: 10 TABLET ORAL at 08:47

## 2021-06-04 NOTE — ASSESSMENT & PLAN NOTE
· Since symptoms have been 3day-old could start aggressive BP management with a systolic blood pressure goal 140-160  · Continue home lisinopril and chlorthalidone  · IV hydralazine as needed for systolic blood pressure greater than 160

## 2021-06-04 NOTE — ASSESSMENT & PLAN NOTE
· Telemetry monitoring  · Will follow the stroke pathway  · Will order MRI of the brain without contrast  · PT OT and speech pathology  · Will order echocardiogram

## 2021-06-04 NOTE — PLAN OF CARE
Problem: PAIN - ADULT  Goal: Verbalizes/displays adequate comfort level or baseline comfort level  Description: Interventions:  - Encourage patient to monitor pain and request assistance  - Assess pain using appropriate pain scale  - Administer analgesics based on type and severity of pain and evaluate response  - Implement non-pharmacological measures as appropriate and evaluate response  - Consider cultural and social influences on pain and pain management  - Notify physician/advanced practitioner if interventions unsuccessful or patient reports new pain  Outcome: Progressing     Problem: INFECTION - ADULT  Goal: Absence or prevention of progression during hospitalization  Description: INTERVENTIONS:  - Assess and monitor for signs and symptoms of infection  - Monitor lab/diagnostic results  - Monitor all insertion sites, i e  indwelling lines, tubes, and drains  - Monitor endotracheal if appropriate and nasal secretions for changes in amount and color  - [place] appropriate cooling/warming therapies per order  - Administer medications as ordered  - Instruct and encourage patient and family to use good hand hygiene technique  - Identify and instruct in appropriate isolation precautions for identified infection/condition  Outcome: Progressing     Problem: SAFETY ADULT  Goal: Patient will remain free of falls  Description: INTERVENTIONS:  - Assess patient frequently for physical needs  -  Identify cognitive and physical deficits and behaviors that affect risk of falls    -  [place] fall precautions as indicated by assessment   - Educate patient/family on patient safety including physical limitations  - Instruct patient to call for assistance with activity based on assessment  - Modify environment to reduce risk of injury  - Consider OT/PT consult to assist with strengthening/mobility  Outcome: Progressing  Goal: Maintain or return to baseline ADL function  Description: INTERVENTIONS:  -  Assess patient's ability to carry out ADLs; assess patient's baseline for ADL function and identify physical deficits which impact ability to perform ADLs (bathing, care of mouth/teeth, toileting, grooming, dressing, etc )  - Assess/evaluate cause of self-care deficits   - Assess range of motion  - Assess patient's mobility; develop plan if impaired  - Assess patient's need for assistive devices and provide as appropriate  - Encourage maximum independence but intervene and supervise when necessary  - Involve family in performance of ADLs  - Assess for home care needs following discharge   - Consider OT consult to assist with ADL evaluation and planning for discharge  - Provide patient education as appropriate  Outcome: Progressing  Goal: Maintain or return mobility status to optimal level  Description: INTERVENTIONS:  - Assess patient's baseline mobility status (ambulation, transfers, stairs, etc )    - Identify cognitive and physical deficits and behaviors that affect mobility  - Identify mobility aids required to assist with transfers and/or ambulation (gait belt, sit-to-stand, lift, walker, cane, etc )  - [place] fall precautions as indicated by assessment  - Record patient progress and toleration of activity level on Mobility SBAR; progress patient to next Phase/Stage  - Instruct patient to call for assistance with activity based on assessment  - Consider rehabilitation consult to assist with strengthening/weightbearing, etc   Outcome: Progressing     Problem: DISCHARGE PLANNING  Goal: Discharge to home or other facility with appropriate resources  Description: INTERVENTIONS:  - Identify barriers to discharge w/patient and caregiver  - Arrange for needed discharge resources and transportation as appropriate  - Identify discharge learning needs (meds, wound care, etc )  - Arrange for interpretive services to assist at discharge as needed  - Refer to Case Management Department for coordinating discharge planning if the patient needs post-hospital services based on physician/advanced practitioner order or complex needs related to functional status, cognitive ability, or social support system  Outcome: Progressing     Problem: Knowledge Deficit  Goal: Patient/family/caregiver demonstrates understanding of disease process, treatment plan, medications, and discharge instructions  Description: Complete learning assessment and assess knowledge base  Interventions:  - Provide teaching at level of understanding  - Provide teaching via preferred learning methods  Outcome: Progressing     Problem: Neurological Deficit  Goal: Neurological status is stable or improving  Description: Interventions:  - Monitor and assess patient's level of consciousness, motor function, sensory function, and level of assistance needed for ADLs  - Monitor and report changes from baseline  Collaborate with interdisciplinary team to initiate plan and implement interventions as ordered  - Provide and maintain a safe environment  - Consider seizure precautions  - Consider fall precautions  - Consider aspiration precautions  - Consider bleeding precautions  Outcome: Progressing     Problem: Activity Intolerance/Impaired Mobility  Goal: Mobility/activity is maintained at optimum level for patient  Description: Interventions:  - Assess and monitor patient  barriers to mobility and need for assistive/adaptive devices  - Assess patient's emotional response to limitations  - Collaborate with interdisciplinary team and initiate plans and interventions as ordered  - Encourage independent activity per ability   - Maintain proper body alignment  - Perform active/passive rom as tolerated/ordered    - Plan activities to conserve energy   - Turn patient as appropriate  Outcome: Progressing     Problem: Communication Impairment  Goal: Ability to express needs and understand communication  Description: Assess patient's communication skills and ability to understand information  Patient will demonstrate use of effective communication techniques, alternative methods of communication and understanding even if not able to speak  - Encourage communication and provide alternate methods of communication as needed  - Collaborate with case management/ for discharge needs  - Include patient/family/caregiver in decisions related to communication  Outcome: Progressing     Problem: Potential for Aspiration  Goal: Non-ventilated patient's risk of aspiration is minimized  Description: Assess and monitor vital signs, respiratory status, and labs (WBC)  Monitor for signs of aspiration (tachypnea, cough, rales, wheezing, cyanosis, fever)  - Assess and monitor patient's ability to swallow  - Place patient up in chair to eat if possible  - HOB up at 90 degrees to eat if unable to get patient up into chair   - Supervise patient during oral intake  - Instruct patient/ family to take small bites  - Instruct patient/ family to take small single sips when taking liquids  - Follow patient-specific strategies generated by speech pathologist   Outcome: Progressing  Goal: Ventilated patient's risk of aspiration is minimized  Description: Assess and monitor vital signs, respiratory status, airway cuff pressure, and labs (WBC)  Monitor for signs of aspiration (tachypnea, cough, rales, wheezing, cyanosis, fever)  - Elevate head of bed 30 degrees if patient has tube feeding   - Monitor tube feeding  Outcome: Progressing     Problem: Nutrition  Goal: Nutrition/Hydration status is improving  Description: Monitor and assess patient's nutrition/hydration status for malnutrition (ex- brittle hair, bruises, dry skin, pale skin and conjunctiva, muscle wasting, smooth red tongue, and disorientation)  Collaborate with interdisciplinary team and initiate plan and interventions as ordered  Monitor patient's weight and dietary intake as ordered or per policy   Utilize nutrition screening tool and intervene per policy  Determine patient's food preferences and provide high-protein, high-caloric foods as appropriate  - Assist patient with eating   - Allow adequate time for meals   - Encourage patient to take dietary supplement as ordered  - Collaborate with clinical nutritionist   - Include patient/family/caregiver in decisions related to nutrition  Outcome: Progressing     Problem: NEUROSENSORY - ADULT  Goal: Achieves stable or improved neurological status  Description: INTERVENTIONS  - Monitor and report changes in neurological status  - Monitor vital signs such as temperature, blood pressure, glucose, and any other labs ordered   - Initiate measures to prevent increased intracranial pressure  - Monitor for seizure activity and implement precautions if appropriate      Outcome: Progressing  Goal: Remains free of injury related to seizures activity  Description: INTERVENTIONS  - Maintain airway, patient safety  and administer oxygen as ordered  - Monitor patient for seizure activity, document and report duration and description of seizure to physician/advanced practitioner  - If seizure occurs,  ensure patient safety during seizure  - Reorient patient post seizure  - Seizure pads on all 4 side rails  - Instruct patient/family to notify RN of any seizure activity including if an aura is experienced  - Instruct patient/family to call for assistance with activity based on nursing assessment  - Administer anti-seizure medications if ordered    Outcome: Progressing  Goal: Achieves maximal functionality and self care  Description: INTERVENTIONS  - Monitor swallowing and airway patency with patient fatigue and changes in neurological status  - Encourage and assist patient to increase activity and self care     - Encourage visually impaired, hearing impaired and aphasic patients to use assistive/communication devices  Outcome: Progressing

## 2021-06-04 NOTE — ASSESSMENT & PLAN NOTE
· Could be the etiology for right-sided symptoms  CT head showing no early evidence of CVA  · Discussed with neurology and patient given aspirin 325 mg x 1 dose and Plavix 300 mg x1 dose in ER  · Continue aspirin 81 mg plus Plavix 75 mg plus atorvastatin 80 mg on discharge  · Follow up with Neurology outpatient

## 2021-06-04 NOTE — ASSESSMENT & PLAN NOTE
· Patient presented to the ED with complaints of right-sided numbness to her right arm, leg and face  · Patient monitored on telemetry; noted to be NSR  · Patient was maintained on the stroke pathway  · MRI Brain (6/4): Probable subacute left posterolateral thalamic lacunar infarction  Abnormal PCA flow voids in keeping with the CTA defined bilateral P2 segment high-grade stenosis/occlusion  Please see the separate CTA head study report from Jocelyn 3, 2021  · PT/OT evaluated  · No needs  · ECHO (6/4): Ejection fraction was estimated to be 60 %  Although no diagnostic regional wall motion abnormality was identified, this possibility cannot be completely excluded on the basis of this study

## 2021-06-04 NOTE — ASSESSMENT & PLAN NOTE
· Could be the etiology for right-sided symptoms    CT head showing no early evidence of CVA  · Discussed with neurology and patient will be given aspirin 325 mg x 1 dose and Plavix 300 mg x1 dose  · Continue aspirin 81 mg plus Plavix 75 mg plus atorvastatin 80 mg  · Consult to both Neurology and Neurosurgery placed

## 2021-06-04 NOTE — DISCHARGE SUMMARY
[address] [facility]     Discharge- Lee Mcmanus 1965, 64 y o  female MRN: [id number]  Unit/Bed#: -01 Encounter: [id number]  Primary Care Provider: Héctor Luong MD   Date and time admitted to hospital: 6/3/2021  3:55 PM    * Right sided numbness  Assessment & Plan  · Patient presented to the ED with complaints of right-sided numbness to her right arm, leg and face  · Patient monitored on telemetry; noted to be NSR  · Patient was maintained on the stroke pathway  · MRI Brain (6/4): Probable subacute left posterolateral thalamic lacunar infarction  Abnormal PCA flow voids in keeping with the CTA defined bilateral P2 segment high-grade stenosis/occlusion  Please see the separate CTA head study report from Jocelyn 3, 2021  · PT/OT evaluated  · No needs  · ECHO (6/4): Ejection fraction was estimated to be 60 %  Although no diagnostic regional wall motion abnormality was identified, this possibility cannot be completely excluded on the basis of this study  Left thalamic infarction [facility])  Assessment & Plan  · Plan same as above    Hypertension  Assessment & Plan  · Chronic; patient has been non-compliant at home with Lisinopril  · Continue home lisinopril and chlorthalidone  · Importance of anti-hypertensive medication addressed  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  Assessment & Plan  · Could be the etiology for right-sided symptoms  CT head showing no early evidence of CVA  · Discussed with neurology and patient given aspirin 325 mg x 1 dose and Plavix 300 mg x1 dose in ER  · Continue aspirin 81 mg plus Plavix 75 mg plus atorvastatin 80 mg on discharge  · Follow up with Neurology outpatient  Occlusion and stenosis of right posterior cerebral artery  Assessment & Plan  · Same as above  · Neurosurgery evaluated during admission; no need to follow up  Follow with Neurology and continue Asa/Plavix/Statin      Resolved Problems  Date Reviewed: 8/19/2019    None        Discharging Physician / Practitioner: RANDY Batista  PCP: Hanane Martinez MD  Admission Date:   Admission Orders (From admission, onward)     Ordered        06/03/21 1937  Inpatient Admission  Once         06/03/21 1933  Inpatient Admission  Once         06/03/21 1907  Place in Observation  Once,   Status:  Canceled         06/03/21 1850  Place in Observation  Once,   Status:  Canceled                   Discharge Date: 06/04/21    Consultations During Hospital Stay:  · Neurology    Procedures Performed:   · None    Significant Findings / Test Results:   MRI brain wo contrast   Final Result by Toby Ochoa MD (06/04 3480)      Probable subacute left posterolateral thalamic lacunar infarction  Abnormal PCA flow voids in keeping with the CTA defined bilateral P2 segment high-grade stenosis/occlusion  Please see the separate CTA head study report from Jocelyn 3, 2021  I personally discussed this study with RANDY Batista on 6/4/2021 at 8:17 AM                Workstation performed: [id number]         CTA head and neck with and without contrast   Final Result by Liv Barrera MD (06/03 6988)      No evidence of acute intracranial hemorrhage  Microangiopathy  Right P2 segment high-grade 5 mm length critical stenosis/occlusion  Long segment left P2 segment occlusion  Consider follow-up noncontrast brain MRI  I personally discussed this study with Ari Linder on 6/3/2021 at 6:37 PM                                Workstation performed: [id number]         XR chest 1 view portable   ED Interpretation by Marily Andre MD (06/03 0654)   No infiltrates  No CHF  Final Result by Horace Victor MD (06/04 8740)      No acute cardiopulmonary disease  Workstation performed: [id number]             Incidental Findings:   · None     Test Results Pending at Discharge (will require follow up):    · None Outpatient Tests Requested:  · Follow up with PCP within 1 week  · Follow up with Neurology    Complications:  None    Reason for Admission: Right sided numbness    Hospital Course:   Juan Davis is a 64 y o  female patient who originally presented to the hospital on 6/3/2021 due to right upper, lower extremity numbness as well as right facial numbness  Patient with past medical history of hypertension, diabetes mellitus, and asthma  Patient was initiated on the stroke pathway, MRI obtained as well as a CTA of the head and neck with results noted above  Neurology consult as well as Neurosurgery  Patient has history of hypertension and admits to not taking her lisinopril for several days, importance of taking antihypertensives reiterated to patient  Patient with elevated blood pressures on admission  Neurology started patient on aspirin and Plavix as well as a statin  Neurosurgery did see patient and had no acute interventions recommended; recommending to follow-up outpatient with Neurology  Vital signs improved once restarted lisinopril  Vital signs are stable, labs are stable  Patient is stable for discharge, neurology cleared patient for discharge  The patient, initially admitted to the hospital as inpatient, was discharged earlier than expected given the following: stroke workup completed       Please see above list of diagnoses and related plan for additional information  Condition at Discharge: stable    Discharge Day Visit / Exam:   Subjective:  I feel better, I have some right sided numbness off and on but it seems to resolve on its own  Denies chest pain, shortness of breath, fever, or chills        Vitals: Blood Pressure: 133/77 (06/04/21 1409)  Pulse: 83 (06/04/21 1409)  Temperature: 98 4 °F (36 9 °C) (06/04/21 1100)  Temp Source: Oral (06/04/21 0554)  Respirations: 16 (06/04/21 1100)  Height: 5' 3" (160 cm) (06/03/21 2304)  Weight - Scale: 95 7 kg (211 lb) (06/03/21 2304)  SpO2: 98 % (06/04/21 4420)     Exam:   Physical Exam  Vitals signs and nursing note reviewed  Constitutional:       General: She is not in acute distress  Appearance: She is normal weight  She is not ill-appearing  Cardiovascular:      Rate and Rhythm: Normal rate and regular rhythm  Pulses: Normal pulses  Heart sounds: Normal heart sounds  Pulmonary:      Effort: Pulmonary effort is normal       Breath sounds: Normal breath sounds  Abdominal:      General: Bowel sounds are normal       Palpations: Abdomen is soft  Musculoskeletal: Normal range of motion  General: No swelling  Skin:     General: Skin is warm and dry  Capillary Refill: Capillary refill takes less than 2 seconds  Neurological:      Mental Status: She is alert and oriented to person, place, and time  Sensory: Sensory deficit (right sided numbness) present  Psychiatric:         Mood and Affect: Mood normal         Discussion with Family: Patient declined call to   Discharge instructions/Information to patient and family:   See after visit summary for information provided to patient and family  Provisions for Follow-Up Care:  See after visit summary for information related to follow-up care and any pertinent home health orders  Disposition:   Home    Planned Readmission: None     Discharge Statement:  I spent 35 minutes discharging the patient  This time was spent on the day of discharge  I had direct contact with the patient on the day of discharge  Greater than 50% of the total time was spent examining patient, answering all patient questions, arranging and discussing plan of care with patient as well as directly providing post-discharge instructions  Additional time then spent on discharge activities  Discharge Medications:  See after visit summary for reconciled discharge medications provided to patient and/or family        **Please Note: This note may have been constructed using a voice recognition system**

## 2021-06-04 NOTE — UTILIZATION REVIEW
Inpatient Admission Authorization Request   NOTIFICATION OF INPATIENT ADMISSION/INPATIENT AUTHORIZATION REQUEST   SERVICING FACILITY:   [address] [place] [address]  Tax ID: [id number]  NPI: [id number]  Place of Service: Inpatient [facility]  [address]  Place of Service Code: 24     ATTENDING PROVIDER:  Attending Name and NPI#: Mindy Collins [[id number]]  Address: [address] [place] [address]  Phone: [phone]     UTILIZATION REVIEW CONTACT:  Stephan Montalvo, Utilization   Network Utilization Review Department  Phone: [phone]  Fax [phone]  Email: [email or web address]     PHYSICIAN ADVISORY SERVICES:  FOR [id number] REVIEW - MEDICAL NECESSITY DENIAL  Phone: [phone]  Fax: [phone]  Email: [email or web address]     TYPE OF REQUEST:  Inpatient Status     ADMISSION INFORMATION:  ADMISSION DATE/TIME: 6/3/21 1933  PATIENT DIAGNOSIS CODE/DESCRIPTION:  Numbness [R20 0]  Hypertension [I10]  Occlusion and stenosis of right posterior cerebral artery [I66 21]  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

## 2021-06-04 NOTE — ASSESSMENT & PLAN NOTE
64year old female with HTN, DM who presented to the hospital with complaints of intermittent R sided numbness  She underwent CTA head and neck which demonstrated right P2 segment high-grade stenosis/occlusion and also long segment left P2 segment occlusion  MRI brain completed demonstrates subacute left posterolateral thalamic lacunar infarction  Plan:   - Continue on ASA 81 mg QD and Plavix 75 mg QD x 3 months given intracranial stenosis noted  After 3 months, would recommend discontinue Plavix and continue on ASA 81 mg QD monotherapy  - Continue on Lipitor, would decrease dose to 40 mg QPM    - Hemoglobin A1c reviewed, 6 4   - Fasting lipid panel reviewed, total cholesterol 174, triglycerides 84, HDL 75, LDL 82   - Echocardiogram pending    - Goal normothermia, normotension, euglycemia  - Stroke education    - Patient will need outpatient stroke follow-up  Appointment has been requested with office

## 2021-06-04 NOTE — H&P
[address] [facility]  H&P- Ryland Coats 1965, 64 y o  female MRN: [id number]  Unit/Bed#: -01 Encounter: [id number]  Primary Care Provider: Sandrita Moreno MD   Date and time admitted to hospital: 6/3/2021  3:55 PM         [facility] - History and Physical:       Ryland Coats 64 y o  female MRN: [id number]  Unit/Bed#: -01 Encounter: [id number]  Admitting Physician: Juany Shields MD  PCP: Sandrita Moreno MD  Date of Admission:  06/03/21        Assessment and Plan:     * Right sided numbness  Assessment & Plan  · Telemetry monitoring  · Will follow the stroke pathway  · Will order MRI of the brain without contrast  · PT OT and speech pathology  · Will order echocardiogram    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  Assessment & Plan  · Could be the etiology for right-sided symptoms  CT head showing no early evidence of CVA  · Discussed with neurology and patient will be given aspirin 325 mg x 1 dose and Plavix 300 mg x1 dose  · Continue aspirin 81 mg plus Plavix 75 mg plus atorvastatin 80 mg  · Consult to both Neurology and Neurosurgery placed      Occlusion and stenosis of right posterior cerebral artery  Assessment & Plan  · Same as above    Hypertensive crisis  Assessment & Plan  · Since symptoms have been 3day-old could start aggressive BP management with a systolic blood pressure goal 140-160  · Continue home lisinopril and chlorthalidone  · IV hydralazine as needed for systolic blood pressure greater than 160            VTE Prophylaxis: Enoxaparin (Lovenox)  / sequential compression device   Code Status: full    Anticipated Length of Stay:  Patient will be admitted on an Inpatient basis with an anticipated length of stay of  2 midnights  Justification for Hospital Stay:  Right-sided numbness    Total Time for Visit, including Counseling / Coordination of Care: 30 minutes    Greater than 50% of this total time spent on direct patient counseling and coordination of care  Chief Complaint:   Right-sided numbness    History of Present Illness:    Mohinder Jensen is a 64 y o  female who presented to the ED with complaints of right-sided numbness, including her right arm, right leg, as well as right face  This started 2 days ago and has been fairly constant  The patient denied any associated weakness or paralysis  She denied any visual disturbances/slurred speech/expressive aphasia  Patient received her COVID vaccination at the beginning of May  She mentioned that since then she has not been feeling well overall  She has no history of CVA or MS  Review of Systems:    Review of Systems   Constitutional: Negative  HENT: Negative  Eyes: Negative  Respiratory: Negative  Cardiovascular: Negative  Gastrointestinal: Negative  Genitourinary: Negative  Musculoskeletal: Negative  Skin: Negative  Neurological: Positive for numbness  Psychiatric/Behavioral: Negative  Past Medical and Surgical History:     Past Medical History:   Diagnosis Date    Asthma     Diabetes mellitus ([facility] [address] )     Hypertension        Past Surgical History:   Procedure Laterality Date    CHOLECYSTECTOMY      GASTRIC BYPASS      HERNIA REPAIR      TUBAL LIGATION         Meds/Allergies:    Prior to Admission medications    Medication Sig Start Date End Date Taking? Authorizing Provider   aspirin 81 mg chewable tablet Chew 1 tablet (81 mg total) daily  Patient not taking: Reported on 8/19/2019 1/22/19   Zully Fairchild PA-C   budesonide-formoterol [facility]) 160-4 5 mcg/act inhaler Inhale 2 puffs 2 (two) times a day Rinse mouth after use      Historical Provider, MD   chlorthalidone (HYGROTEN) 50 MG tablet Take 50 mg by mouth daily    Historical Provider, MD   indomethacin (INDOCIN) 50 mg capsule Take 1 capsule (50 mg total) by mouth 2 (two) times a day with meals 9/28/20   Hemalatha Weaver PA-C   lisinopril (ZESTRIL) 5 mg tablet Take 1 tablet (5 mg total) by mouth daily for 30 days  Patient taking differently: Take 10 mg by mouth daily  4/9/19 8/19/19  Don Pozo MD     I have reviewed home medications with patient personally  Allergies: Allergies   Allergen Reactions    Anesthesia S-I-40 [Propofol] Rash       Social History:     Marital Status: Single     Social History     Substance and Sexual Activity   Alcohol Use Not Currently    Frequency: Never    Comment: occ     Social History     Tobacco Use   Smoking Status Never Smoker   Smokeless Tobacco Never Used     Social History     Substance and Sexual Activity   Drug Use No       Family History:    non-contributory    Physical Exam:     Vitals:   Blood Pressure: (!) 197/94 (06/03/21 1900)  Pulse: (!) 53 (06/03/21 1900)  Temperature: 97 7 °F (36 5 °C) (06/03/21 1514)  Temp Source: Oral (06/03/21 1514)  Respirations: 20 (06/03/21 1900)  SpO2: 99 % (06/03/21 1900)    Physical Exam  Constitutional:       Appearance: Normal appearance  HENT:      Head: Normocephalic and atraumatic  Eyes:      Extraocular Movements: Extraocular movements intact  Pupils: Pupils are equal, round, and reactive to light  Neck:      Musculoskeletal: Neck supple  Cardiovascular:      Rate and Rhythm: Bradycardia present  Pulmonary:      Effort: Pulmonary effort is normal       Breath sounds: Normal breath sounds  Abdominal:      General: Bowel sounds are normal       Palpations: Abdomen is soft  Musculoskeletal: Normal range of motion  Skin:     General: Skin is warm and dry  Neurological:      Mental Status: She is alert and oriented to person, place, and time  Psychiatric:         Mood and Affect: Mood normal        Additional Data:     Lab Results: I have personally reviewed pertinent reports        Results from last 7 days   Lab Units 06/03/21  1631   WBC Thousand/uL 4 81   HEMOGLOBIN g/dL 12 6   HEMATOCRIT % 39 8   PLATELETS Thousands/uL 229   NEUTROS PCT % 32*   LYMPHS PCT % 53*   MONOS PCT % 10   EOS PCT % 4     Results from last 7 days   Lab Units 06/03/21  1631   SODIUM mmol/L 142   POTASSIUM mmol/L 4 0   CHLORIDE mmol/L 107   CO2 mmol/L 30   BUN mg/dL 15   CREATININE mg/dL 0 91   ANION GAP mmol/L 5   CALCIUM mg/dL 8 5   ALBUMIN g/dL 3 4*   TOTAL BILIRUBIN mg/dL 0 23   ALK PHOS U/L 119*   ALT U/L 25   AST U/L 21   GLUCOSE RANDOM mg/dL 102                       Imaging: I have personally reviewed pertinent reports  CTA head and neck with and without contrast   Final Result by Margie Belle MD (06/03 5298)      No evidence of acute intracranial hemorrhage  Microangiopathy  Right P2 segment high-grade 5 mm length critical stenosis/occlusion  Long segment left P2 segment occlusion  Consider follow-up noncontrast brain MRI  I personally discussed this study with Apolinar Crouch on 6/3/2021 at 6:37 PM                                Workstation performed: [id number]         XR chest 1 view portable   ED Interpretation by Carmencita Vaughan MD (06/03 4762)   No infiltrates  No CHF  MRI Inpatient Order    (Results Pending)           Allscripts / Epic Records Reviewed: Yes     ** Please Note: This note has been constructed using a voice recognition system   **

## 2021-06-04 NOTE — ASSESSMENT & PLAN NOTE
- Goal normotension    - Management as per medicine team  Discussed with them today that patient has been noncompliant with Lisinopril at home and they will discuss additional treatment options with her today  - BP needs to be well-controlled and patient recommended to monitor her BP at home

## 2021-06-04 NOTE — TELEMEDICINE
On-Call Telemedicine Note      Jose Andrebles 64 y o  female MRN: [id number]  Unit/Bed#: -01 Encounter: [id number]    Per provider report, patient presents with right side numbness x 2 days to arm, leg and face  Noted hypertensive on arrival 197/94  Placed on stroke pathway  Had been seeing Dr Matt Ramirez in the past for bilateral carpal tunnel syndrome  Available past medical history,social history, surgical history, medication list, drug allergies and review of systems were reviewed  /85   Pulse 67   Temp 98 4 °F (36 9 °C)   Resp 16   Ht 5' 3" (1 6 m)   Wt 95 7 kg (211 lb)   SpO2 97%   BMI 37 38 kg/m²      Clinical exam per provider report, non-focal exam  No neurologic deficits  CTA head and neck with right P2 segment high-grade 5 mm length critical stenosis/occlusion  MRI brain wo indicating subacute left posterolateral thalamic lacunar infarct  Abnormal PCA flow voids similar to what was seen on CTA  Imaging personally reviewed  Assessment and Plan  1  Agree with neurology recommendations  2  Continue standard treatment for symptomatic cerebral atherosclerosis ASA 81 mg daily and Plavix 75 mg daily  3  Recommend ECHO  4  No neurosurgical intervention anticipated  No follow up needed  Follow up outpatient with neurology as scheduled  All questions answered  Provider is in agreement with the course of action  A total of 14 minutes was spent discussing the presentation, physical exam and formulating a management plan with the provider

## 2021-06-04 NOTE — ASSESSMENT & PLAN NOTE
· Chronic; patient has been non-compliant at home with Lisinopril  · Continue home lisinopril and chlorthalidone  · Importance of anti-hypertensive medication addressed

## 2021-06-04 NOTE — OCCUPATIONAL THERAPY NOTE
Occupational Therapy Evaluation        Patient Name: Otto Brenal  [id number] Date: 6/4/2021 06/04/21 1021   OT Last Visit   OT Visit Date 06/04/21   Note Type   Note type Evaluation   Restrictions/Precautions   Weight Bearing Precautions Per Order No   Braces or Orthoses Other (Comment)  (none per pt)   Other Precautions Chair Alarm; Bed Alarm;Telemetry; Fall Risk   Pain Assessment   Pain Assessment Tool 0-10   Pain Score No Pain   Home Living   Type of [facility] [address] Multi-level;Bed/bath upstairs;Stairs to enter with rails  (4-5 HUDSON; FOS to each floor)   Bathroom Shower/Tub Walk-in shower   Bathroom Toilet Standard   Bathroom Equipment Other (Comment)  (none per pt)   [address] Other (Comment)  (none per pt)   Additional Comments Patient ambulatory with no AD   Prior Function   Level of [place] Independent with ADLs and functional mobility   Lives With Daughter  (2 daughters and grandaughter/grandson)   Receives Help From Family   ADL Assistance Independent   IADLs Independent   Falls in the last 6 months 0   Vocational Full time employment   Comments Home setup and PLOF obtained via chart review from PT evaluation earlier this AM    Lifestyle   Autonomy Patient lives with her 2 daughters and grandchildren in a multi-level home with 4-5 HUDSON and a FOS to second floor where the bedroom/bathroom are located  Prior to admission, patient reports independence in ADLs/IADLs  At baseline patient ambulates with no AD and is employed full time     Reciprocal Relationships family   Service to Others works for [employer] in [facility] [address] ([employer]) [address] [place]  7  [address] [facility] 6  Modified Independent   [address] [address] 6  Modified Independent   [address] [facility] [facility] [address]  [address] [facility] 6  Modified independent   [place] Assistance  5  Supervision/Setup   Functional Assistance 6  Modified independent   Additional Comments ADL assist levels based on pt functional performance during OT evaluation   Bed Mobility   Supine to Sit 6  Modified independent   Additional items HOB elevated;Verbal cues   Sit to Supine 6  Modified independent   Additional items Verbal cues   Additional Comments Pt receieved lying supine in bed upon OT arrival  Post session; pt returned to lying supine in bed with call bell and all needs within reach   Transfers   Sit to Stand 6  Modified independent   Additional items Increased time required;Verbal cues   Stand to Sit 6  Modified independent   Additional items Increased time required;Verbal cues   Toilet transfer 6  Modified independent   Additional items Increased time required;Verbal cues;Standard toilet   Additional Comments Pt performed functional transfers with no AD   Functional Mobility   Functional Mobility 6  Modified independent   Additional Comments Pt walked to/from bathroom with no overt LOB or SOB     Balance   Static Sitting Normal   Dynamic Sitting Good   Static Standing Good   Dynamic Standing Good   Ambulatory Good   Activity Tolerance   Activity Tolerance Patient tolerated treatment well   Medical Staff Made Aware OT Jun Vasquez confirmed pt appropriate for IE   Nurse Made Aware case discussed with RN    RUE Assessment   RUE Assessment WFL  (AROM and strength formally assessed equally b/l)   LUE Assessment   LUE Assessment WFL  (AROM and strength formally assessed equal b/l)   Hand Function   Gross Motor Coordination Functional   Fine Motor Coordination Functional   Sensation   Light Touch Partial deficits in the RUE  (pt reports intermittent numbness distally from elbow)   Additional Comments Pt reports symptoms have been progressively improving each day   Vision-Basic Assessment   Current Vision Does not wear glasses   Patient Visual Report Other (Comment)  (pt reports no acute changes)   Cognition   Overall Cognitive Status WFL   Arousal/Participation Alert; Responsive; Cooperative   Attention Within functional limits   Orientation Level Oriented X4   Memory Within functional limits   Following Commands Follows all commands and directions without difficulty   Comments pt agreeable to OT evaluation   Assessment   Limitation Decreased sensation   Prognosis Good   Assessment Patient is a 65 y/o female who was admitted to [address] [place] [address] on 6/3/2021 with c/o R sided numbness  Co-morbidities affecting performance include 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 and hypertensive crisis  Two patient identifiers were noted to confirm patient ID  Patient lives with her 2 daughters and grandchildren in a multi-level home with 4-5 HUDSON and a FOS to second floor where the bedroom/bathroom are located  Prior to admission, patient reports independence in ADLs/IADLs  At baseline patient ambulates with no AD and is employed full time    Currently, patient requires Mod I for UB ADLs and Mod I for LB ADLs  Patient ambulates with Mod I with no AD  Patient is alert and oriented x4  Personal factors impacting functional performance include steps to enter  Patient limitations include decreased UE sensation  At time of IE, pt presents with functional use of BUEs, with intact prehension, coordination and symmetrical muscle strength  Patient appears to be functioning close to/at baseline level of functioning, indicating no need for acute OT services at this time  Please re-consult OT if indicated or change in medical status  From OT standpoint, recommendation at time of d/c would be Home with family support      Goals   Patient Goals to get back to work   Recommendation   OT Discharge Recommendation No rehabilitation needs   OT - OK to Discharge Yes  (pending medical clearance)   AM-PAC Daily Activity Inpatient   Lower Body Dressing 4   Bathing 3   Toileting 3   Upper Body Dressing 4 Grooming 4   Eating 4   Daily Activity Raw Score 22   Daily Activity Standardized Score (Calc for Raw Score >=11) 47  1   AM-PAC Applied Cognition Inpatient   Following a Speech/Presentation 4   Understanding Ordinary Conversation 4   Taking Medications 4   Remembering Where Things Are Placed or Put Away 4   Remembering List of 4-5 Errands 4   Taking Care of Complicated Tasks 4   Applied Cognition Raw Score 24   Applied Cognition Standardized Score 62 21   Barthel Index   Feeding 10   Bathing 5   Grooming Score 5   Dressing Score 10   Bladder Score 10   Bowels Score 10   Toilet Use Score 10   Transfers (Bed/Chair) Score 15   Mobility (Level Surface) Score 0   Stairs Score 0   Barthel Index Score 75   Modified Daniel Scale   Modified Daniel Scale 1     Oscar Verma, OT Student

## 2021-06-04 NOTE — ASSESSMENT & PLAN NOTE
· Same as above  · Neurosurgery evaluated during admission; no need to follow up  Follow with Neurology and continue Asa/Plavix/Statin

## 2021-06-04 NOTE — PHYSICAL THERAPY NOTE
Physical Therapy Evaluation     Patient's Name: Domingo Fontaine    Admitting Diagnosis  Numbness [R20 0]  Hypertension [I10]  Occlusion and stenosis of right posterior cerebral artery [I66 21]  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 [I66 22]  Extravasation injury [T14  8XXA]  Right sided numbness [R20 0]    Problem List  Patient Active Problem List   Diagnosis    Thrombophlebitis of superficial veins of right lower extremity    Varicose veins of both lower extremities    Dizziness and giddiness    Numbness and tingling    Bilateral carpal tunnel syndrome    Toe pain, left    Occlusion and stenosis of right posterior cerebral artery    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    Right sided numbness    Hypertension    Left thalamic infarction [facility])     Past Medical History  Past Medical History:   Diagnosis Date    Asthma     Diabetes mellitus ([facility] [address] )     Hypertension      Past Surgical History  Past Surgical History:   Procedure Laterality Date    CHOLECYSTECTOMY      GASTRIC BYPASS      HERNIA REPAIR      TUBAL LIGATION        06/04/21 0753   PT Last Visit   PT Visit Date 06/04/21   Note Type   Note type Evaluation   Pain Assessment   Pain Assessment Tool 0-10   Pain Score No Pain   Home Living   Type of [address] [place] [address] Multi-level;Bed/bath upstairs;Stairs to enter with rails  (4-5 HUDSON; FOS to each floor)   Bathroom Shower/Tub Walk-in shower   Bathroom Toilet Standard   Bathroom Equipment Other (Comment)  (none per patient)   Bathroom Accessibility Accessible   Home Equipment Other (Comment)  (none per patient)   Additional Comments Pt ambulates without an AD     Prior Function   Level of [place] Independent with ADLs and functional mobility   Lives With Daughter  (2 daughters and graddaughter/grandson)   Receives Help From Family   ADL Assistance Independent   IADLs Independent   Falls in the last 6 months 0   Vocational Full time employment Restrictions/Precautions   Weight Bearing Precautions Per Order No   Braces or Orthoses Other (Comment)  (none per patient)   Other Precautions Chair Alarm; Bed Alarm;Telemetry; Fall Risk   General   Family/Caregiver Present No   Cognition   Overall Cognitive Status WFL   Arousal/Participation Alert   Orientation Level Oriented X4   Memory Within functional limits   Following Commands Follows all commands and directions without difficulty   Comments Pt agreeable to PT    RLE Assessment   RLE Assessment WFL   LLE Assessment   LLE Assessment WFL   Light Touch   RLE Light Touch Grossly intact   LLE Light Touch Grossly intact   Bed Mobility   Supine to Sit 6  Modified independent   Additional items HOB elevated;Verbal cues   Transfers   Sit to Stand 6  Modified independent   Additional items Increased time required;Verbal cues   Stand to Sit 6  Modified independent   Additional items Increased time required;Verbal cues   Ambulation/Elevation   Gait pattern WNL   Gait Assistance 7  Independent   Assistive Device None   Distance 150 feet   Stair Management Assistance 6  Modified independent   Additional items Verbal cues   Stair Management Technique One rail R;Alternating pattern; Foreward   Number of Stairs 8   Balance   Static Sitting Normal   Dynamic Sitting Normal   Static Standing Good   Dynamic Standing Good   Ambulatory Good   Activity Tolerance   Activity Tolerance Patient tolerated treatment well   Nurse Made Aware Discussed case with ERICKA Cui; post session pt was left seated in recliner in NAD, all belongings within reach, +chair alarm   Assessment   Prognosis Good   Assessment Pt is 64year old female seen for PT evaluation s/p admit to [facility] on 6/3/2021 with Right sided numbness  PT consulted to assess pt's functional mobility and d/c needs  Order placed for PT eval and tx, with activity as tolerated order   Comorbidities affecting pt's physical performance at time of assessment include occlusion and stenosis of right and left posterior cerebral artery, hypertension, and left thalamic infarction  PTA, pt was independent with all functional mobility without an AD  Personal factors affecting pt at time of IE include lives in a multi  level house and has stairs to enter home  Please find objective findings from PT assessment regarding body systems outlined above  The following objective measures performed on IE also reveal limitations: Barthel Index: 100/100 and Modified Mercedes: 1 (no significant disability)  Pt's clinical presentation is currently stable seen in pt's presentation of need for ongoing medical management/monitoring  Pt is independent with transfers and functional mobility  From PT/mobility standpoint, recommendation at time of d/c would be home and anticipate no needs  Plan to discontinue PT at this time      Barriers to Discharge None   Goals   Patient Goals to return home   Plan   PT Frequency Other (Comment)  (discontinue PT)   Recommendation   PT Discharge Recommendation No rehabilitation needs   PT - OK to Discharge Yes  (when medically cleared)   AM-PAC Basic Mobility Inpatient   Turning in Bed Without Bedrails 4   Lying on Back to Sitting on Edge of Flat Bed 4   Moving Bed to Chair 4   Standing Up From Chair 4   Walk in Room 4   Climb 3-5 Stairs 4   Basic Mobility Inpatient Raw Score 24   Basic Mobility Standardized Score 57 68   Modified Mercedes Scale   Modified Mercedes Scale 1   Barthel Index   Feeding 10   Bathing 5   Grooming Score 5   Dressing Score 10   Bladder Score 10   Bowels Score 10   Toilet Use Score 10   Transfers (Bed/Chair) Score 15   Mobility (Level Surface) Score 15   Stairs Score 10   Barthel Index Score 100     Gokul Lopez, PT, DPT

## 2021-06-04 NOTE — UTILIZATION REVIEW
Initial Clinical Review    Admission: Date/Time/Statement:   Admission Orders (From admission, onward)     Ordered        06/03/21 1937  Inpatient Admission  Once                   Orders Placed This Encounter   Procedures    Inpatient Admission     Standing Status:   Standing     Number of Occurrences:   1     Order Specific Question:   Level of Care     Answer:   Med Surg [16]     Order Specific Question:   Estimated length of stay     Answer:   More than 2 Midnights     Order Specific Question:   Certification     Answer:   I certify that inpatient services are medically necessary for this patient for a duration of greater than two midnights  See H&P and MD Progress Notes for additional information about the patient's course of treatment  ED Arrival Information     Expected Arrival Acuity Means of Arrival Escorted By Service Admission Type    - 6/3/2021 15:04 Urgent Walk-In Family [facility] Hospitalist Urgent    Arrival Complaint    numbness        Chief Complaint   Patient presents with    Numbness     Pt reports numbness of her right side X2 days  Initial Presentation: 64year old female to the ED from home with complaints of right sided numbness for 2 days prior to arrival  Admitted to inpatient for right sided numbness, 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, hypertensive crisis  SHe arrives with elevated BP  CTA head/neck show: Right P2 segment high-grade 5 mm length critical stenosis/occlusion  Long segment left P2 segment occlusion  NIHSS 0  GCS 15  Bp improved spontaneously  PT/OT eval   Check MRI, ECHO  Given ASA in the ED  Neuro consult  Date: 6/4   Day 2:   Neurology consult: MRI brain demonstrates subacute left posterolateral thalamic lacunar infarction  ECHO pending  DC plavix and continue asa   6/4 Neuro surgery:  Continue with asa  No neurosurgical intervention needed at this time      ED Triage Vitals   Temperature Pulse Respirations Blood Pressure SpO2 06/03/21 1514 06/03/21 1514 06/03/21 1514 06/03/21 1514 06/03/21 1514   97 7 °F (36 5 °C) 63 22 (!) 215/98 100 %      Temp Source Heart Rate Source Patient Position - Orthostatic VS BP Location FiO2 (%)   06/03/21 1514 06/03/21 1514 06/03/21 2200 06/03/21 1514 --   Oral Monitor Lying Left arm       Pain Score       06/03/21 2055       No Pain          Wt Readings from Last 1 Encounters:   06/03/21 95 7 kg (211 lb)     Additional Vital Signs:   /Time  Temp Pulse  Resp  BP  MAP (mmHg)  SpO2  O2 Device Patient Position - Orthostatic VS   06/04/21 1100  98 4 °F (36 9 °C) 67  16  160/85  110  97 %  -- --   06/04/21 0817  -- --  --  --  --  98 %  None (Room air) --   06/04/21 0800  98 6 °F (37 °C) 69  16  176/97Abnormal   123  97 %  -- --   06/04/21 05:54:54  98 °F (36 7 °C) 60  16  174/88Abnormal   117  98 %  None (Room air) Lying   06/04/21 04:07:16  98 6 °F (37 °C) 64  17  158/80  106  96 %  None (Room air) Lying   06/04/21 0200  97 9 °F (36 6 °C) 73  16  144/75  98  --  -- Lying   06/04/21 01:59:16  97 9 °F (36 6 °C) 73  16  144/75  98  97 %  None (Room air) Lying   06/04/21 0000  98 6 °F (37 °C) 50Abnormal   16  173/83Abnormal   113  98 %  None (Room air) Lying   06/03/21 23:04:39  98 2 °F (36 8 °C) 51Abnormal   16  175/83Abnormal   114  98 %  None (Room air) Lying   06/03/21 2300  98 2 °F (36 8 °C) 54Abnormal   16  175/83Abnormal   114  --  -- Lying   06/03/21 22:08:21  98 1 °F (36 7 °C) 66  16  163/90  114  98 %  -- Lying   06/03/21 2200  98 1 °F (36 7 °C) 68  16  163/90  114  --  -- Lying   06/03/21 21:05:49  98 °F (36 7 °C) 51Abnormal   16  142/79  100  98 %  -- --   06/03/21 2100  98 °F (36 7 °C) 51Abnormal   16  142/79  100  --  -- --   06/03/21 1900  -- 53Abnormal   20  197/94Abnormal   135  99 %  -- --   06/03/21 1645  -- 66  20  154/86  113  --  -- --   06/03/21 1630  -- 66  20  187/91Abnormal   131  98 %  None (Room air) --   06/03/21 1514  97 7 °F (36 5 °C) 63  22  215/98Abnormal   --  100 %  None (Room air        Pertinent Labs/Diagnostic Test Results:   ? ECHO (6/4): Ejection fraction was estimated to be 60 %  Although no diagnostic regional wall motion abnormality was identified, this possibility cannot be completely excluded on the basis of this study  6/4 MRI brain: Probable subacute left posterolateral thalamic lacunar infarction  Abnormal PCA flow voids in keeping with the CTA defined bilateral P2 segment high-grade stenosis/occlusion  6/3 CTA head/neck: No evidence of acute intracranial hemorrhage  Microangiopathy  Right P2 segment high-grade 5 mm length critical stenosis/occlusion  Long segment left P2 segment occlusion     6/3 EKG: Sinus bradycardia  Nonspecific ST abnormality  Otherwise normal ECG  When compared with ECG of 09-APR-2019 14:27,  No significant change was found        Results from last 7 days   Lab Units 06/04/21  0416 06/03/21  1631   WBC Thousand/uL 3 91* 4 81   HEMOGLOBIN g/dL 12 6 12 6   HEMATOCRIT % 39 3 39 8   PLATELETS Thousands/uL 222 229   NEUTROS ABS Thousands/µL  --  1 55*         Results from last 7 days   Lab Units 06/04/21  0416 06/03/21  1631   SODIUM mmol/L 141 142   POTASSIUM mmol/L 3 4* 4 0   CHLORIDE mmol/L 104 107   CO2 mmol/L 26 30   ANION GAP mmol/L 11 5   BUN mg/dL 10 15   CREATININE mg/dL 0 67 0 91   EGFR ml/min/1 73sq m 99 71   CALCIUM mg/dL 8 7 8 5   MAGNESIUM mg/dL 2 2  --      Results from last 7 days   Lab Units 06/04/21  0416 06/03/21  1631   AST U/L 15 21   ALT U/L 20 25   ALK PHOS U/L 113 119*   TOTAL PROTEIN g/dL 6 5 7 0   ALBUMIN g/dL 3 2* 3 4*   TOTAL BILIRUBIN mg/dL 0 31 0 23   BILIRUBIN DIRECT mg/dL 0 11  --          Results from last 7 days   Lab Units 06/04/21  0416 06/03/21  1631   GLUCOSE RANDOM mg/dL 111 102         Results from last 7 days   Lab Units 06/04/21  0416   HEMOGLOBIN A1C % 6 4*   EAG mg/dl 137       Results from last 7 days   Lab Units 06/03/21  1631   TROPONIN I ng/mL <0 02       Results from last 7 days   Lab Units 06/03/21  1900   CLARITY UA  Clear   COLOR UA  Yellow   SPEC GRAV UA  <=1 005   PH UA  5 5   GLUCOSE UA mg/dl Negative   KETONES UA mg/dl Negative   BLOOD UA  Negative   PROTEIN UA mg/dl Negative   NITRITE UA  Negative   BILIRUBIN UA  Negative   UROBILINOGEN UA E U /dl 0 2   LEUKOCYTES UA  Negative             ED Treatment:   Medication Administration from 06/03/2021 1504 to 06/03/2021 2040       Date/Time Order Dose Route Action     06/03/2021 1906 aspirin tablet 325 mg 325 mg Oral Given     06/03/2021 1958 atorvastatin (LIPITOR) tablet 40 mg 40 mg Oral Given        Past Medical History:   Diagnosis Date    Asthma     Diabetes mellitus ([facility] [address] )     Hypertension        Admitting Diagnosis: Numbness [R20 0]  Hypertension [I10]  Occlusion and stenosis of right posterior cerebral artery [I66 21]  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 [I66 22]  Extravasation injury [T14  8XXA]  Right sided numbness [R20 0]  Age/Sex: 64 y o  female  Admission Orders:  Neuro checks: Every 1 hour x 4 hours, then every 2 hours x 8 hours, then every 4 hours x 72 hours  Vitals every 4 hours  Tele  SCDS  NIHSS every 24 hours    Scheduled Medications:  aspirin, 81 mg, Oral, Daily  atorvastatin, 40 mg, Oral, QPM  budesonide-formoterol, 2 puff, Inhalation, BID  chlorthalidone, 50 mg, Oral, Daily  clopidogrel, 75 mg, Oral, Daily  enoxaparin, 40 mg, Subcutaneous, Daily  Labetalol HCl, 20 mg, Intravenous, Once  lisinopril, 10 mg, Oral, Daily  pantoprazole, 40 mg, Oral, Early Morning      Continuous IV Infusions:     PRN Meds:  hydrALAZINE, 5 mg, Intravenous, Q4H PRN        IP CONSULT TO NEUROLOGY  IP CONSULT TO CASE MANAGEMENT  IP CONSULT TO NUTRITION SERVICES  IP CONSULT TO NEUROSURGERY    Network Utilization Review Department  ATTENTION: Please call with any questions or concerns to [phone] and carefully listen to the prompts so that you are directed to the right person   All voicemails are confidential   Mehanz Lopez all requests for admission clinical reviews, approved or denied determinations and any other requests to dedicated fax number below belonging to the campus where the patient is receiving treatment   List of dedicated fax numbers for the Facilities:  [address] [place] [address] DENIALS (Administrative/Medical Necessity) [phone]   [address] (Maternity/NICU/Pediatrics) [phone]   [address] [facility] [address] [id number] [address] [facility] [place] [address] [phone]

## 2021-06-04 NOTE — DISCHARGE INSTRUCTIONS
Chronic Hypertension   WHAT YOU NEED TO KNOW:   Hypertension is high blood pressure  Your blood pressure is the force of your blood moving against the walls of your arteries  Hypertension causes your blood pressure to get so high that your heart has to work much harder than normal  This can damage your heart  Even if you have hypertension for years, lifestyle changes, medicines, or both can help bring your blood pressure to normal   DISCHARGE INSTRUCTIONS:   Call 911 for any of the following:   · You have chest pain  · You have any of the following signs of a heart attack:      ? Squeezing, pressure, or pain in your chest    ? You may  also have any of the following:     § Discomfort or pain in your back, neck, jaw, stomach, or arm    § Shortness of breath    § Nausea or vomiting    § Lightheadedness or a sudden cold sweat    · You become confused or have difficulty speaking  · You suddenly feel lightheaded or have trouble breathing  Seek care immediately if:   · You have a severe headache or vision loss  · You have weakness in an arm or leg  Contact your healthcare provider if:   · You feel faint, dizzy, confused, or drowsy  · You have been taking your blood pressure medicine but your pressure is higher than your provider says it should be  · You have questions or concerns about your condition or care  Medicines: You may need any of the following:  · Antihypertensives  may be used to help lower your blood pressure  Several kinds of medicines are available  Your healthcare provider may change the medicine or medicines you currently take  This may be needed if your blood pressure is often high when you check it at home or you are having other problems with blood pressure control  · Diuretics  help decrease extra fluid that collects in your body  This will help lower your BP  You may urinate more often while you take this medicine      · Cholesterol medicine  helps lower your cholesterol level  A low cholesterol level helps prevent heart disease and makes it easier to control your blood pressure  · Take your medicine as directed  Contact your healthcare provider if you think your medicine is not helping or if you have side effects  Tell him or her if you are allergic to any medicine  Keep a list of the medicines, vitamins, and herbs you take  Include the amounts, and when and why you take them  Bring the list or the pill bottles to follow-up visits  Carry your medicine list with you in case of an emergency  Follow up with your healthcare provider as directed: You will need to return to have your blood pressure checked and to have other lab tests done  Write down your questions so you remember to ask them during your visits  Stages of hypertension:       · Normal blood pressure is 119/79 or lower   Your healthcare provider may only check your blood pressure each year if it stays at a normal level  · Elevated blood pressure is 120/79 to 129/79   This is sometimes called prehypertension  Your healthcare provider may suggest lifestyle changes to help lower your blood pressure to a normal level  He or she may then check it again in 3 to 6 months  · Stage 1 hypertension is 130/80  to 139/89   Your provider may recommend lifestyle changes, medication, and checks every 3 to 6 months until your blood pressure is controlled  · Stage 2 hypertension is 140/90 or higher   Your provider will recommend lifestyle changes and have you take 2 kinds of hypertension medicines  You will also need to have your blood pressure checked monthly until it is controlled  Manage chronic hypertension:   · Check your blood pressure at home  Avoid smoking, caffeine, and exercise at least 30 minutes before checking your blood pressure  Sit and rest for 5 minutes before you take your blood pressure  Extend your arm and support it on a flat surface  Your arm should be at the same level as your heart   Follow the directions that came with your blood pressure monitor  Check your blood pressure 2 times, 1 minute apart, before you take your medicine in the morning  Also check your blood pressure before your evening meal  Keep a record of your readings and bring it to your follow-up visits  Ask your healthcare provider what your blood pressure should be  · Manage any other health conditions you have  Health conditions such as diabetes can increase your risk for hypertension  Follow your healthcare provider's instructions and take all your medicines as directed  Talk to your healthcare provider about any new health conditions you have recently developed  · Ask about all medicines  Certain medicines can increase your blood pressure  Examples include oral birth control pills, decongestants, herbal supplements, and NSAIDs, such as ibuprofen  Your healthcare provider can tell you which medicines are safe for you to take  This includes prescription and over-the-counter medicines  Lifestyle changes you can make to lower your blood pressure: Your provider may want you to make more lifestyle changes if you are having trouble controlling your blood pressure  This may feel difficult over time, especially if you think you are making good changes but your pressure is still high  It might help to focus on one new change at a time  For example, try to add 1 more day of exercise, or exercise for an extra 10 minutes on 2 days  Small changes can make a big difference  Your healthcare provider can also refer you to specialists such as a dietitian who can help you make small changes  · Limit sodium (salt) as directed  Too much sodium can affect your fluid balance  Check labels to find low-sodium or no-salt-added foods  Some low-sodium foods use potassium salts for flavor  Too much potassium can also cause health problems  Your healthcare provider will tell you how much sodium and potassium are safe for you to have in a day   He or she may recommend that you limit sodium to 2,300 mg a day  · Follow the meal plan recommended by your healthcare provider  A dietitian or your provider can give you more information on low-sodium plans or the DASH (Dietary Approaches to Stop Hypertension) eating plan  The DASH plan is low in sodium, unhealthy fats, and total fat  It is high in potassium, calcium, and fiber  · Exercise to maintain a healthy weight  Exercise at least 30 minutes per day, on most days of the week  This will help decrease your blood pressure  Ask your healthcare provider about the best exercise plan for you  · Decrease stress  This may help lower your blood pressure  Learn ways to relax, such as deep breathing or listening to music  · Limit alcohol as directed  Alcohol can increase your blood pressure  A drink of alcohol is 12 ounces of beer, 5 ounces of wine, or 1½ ounces of liquor  · Do not smoke  Nicotine and other chemicals in cigarettes and cigars can increase your blood pressure and also cause lung damage  Ask your healthcare provider for information if you currently smoke and need help to quit  E-cigarettes or smokeless tobacco still contain nicotine  Talk to your healthcare provider before you use these products  © Copyright [address] [place], [place] [address] Drive Information is for End User's use only and may not be sold, redistributed or otherwise used for commercial purposes  All illustrations and images included in CareNotes® are the copyrighted property of [employer]  or [facility] Sherri Ross   The above information is an  only  It is not intended as medical advice for individual conditions or treatments  Talk to your doctor, nurse or pharmacist before following any medical regimen to see if it is safe and effective for you  Ischemic Stroke   WHAT YOU NEED TO KNOW:   An ischemic stroke occurs when blood flow to part of your brain is blocked   The block is usually caused by a blood clot that gets stuck in a narrow blood vessel  When oxygen cannot get to an area of the brain, tissue in that area may get damaged  The damage can cause loss of body functions controlled by that area of the brain  A stroke is a medical emergency that needs immediate treatment  Most medicines and treatments work best the sooner they are given  DISCHARGE INSTRUCTIONS:   Call your local emergency number (911 in the [facility] [facility]) or have someone else call if:   · You have any of the following signs of a stroke:      ? Numbness or drooping on one side of your face     ? Weakness in an arm or leg    ? Confusion or difficulty speaking    ? Dizziness, a severe headache, or vision loss       · You have a seizure  · You have chest pain or shortness of breath  Seek care immediately if:   · Your arm or leg feels warm, tender, and painful  It may look swollen and red  · You have loss of balance or coordination  · You have double vision or vision loss  · You have unusual or heavy bleeding  Call your doctor if:   · Your blood pressure is higher or lower than you were told it should be  · You have questions or concerns about your condition or care  Warning signs of a stroke: The words BE FAST can help you remember and recognize warning signs of a stroke:  · B = Balance:  Sudden loss of balance    · E = Eyes:  Loss of vision in one or both eyes    · F = Face:  Face droops on one side    · A = Arms:  Arm drops when both arms are raised    · S = Speech:  Speech is slurred or sounds different    · T = Time:  Time to get help immediately     Medicines: You may  need any of the following:  · Antiplatelets , such as aspirin, help prevent blood clots  Take your antiplatelet medicine exactly as directed  These medicines make it more likely for you to bleed or bruise  If you are told to take aspirin, do not take acetaminophen or ibuprofen instead  · Blood thinners  help prevent blood clots   Clots can cause strokes, heart attacks, and death  The following are general safety guidelines to follow while you are taking a blood thinner:    ? Watch for bleeding and bruising while you take blood thinners  Watch for bleeding from your gums or nose  Watch for blood in your urine and bowel movements  Use a soft washcloth on your skin, and a soft toothbrush to brush your teeth  This can keep your skin and gums from bleeding  If you shave, use an electric shaver  Do not play contact sports  ? Tell your dentist and other healthcare providers that you take a blood thinner  Wear a bracelet or necklace that says you take this medicine  ? Do not start or stop any other medicines unless your healthcare provider tells you to  Many medicines cannot be used with blood thinners  ? Take your blood thinner exactly as prescribed by your healthcare provider  Do not skip does or take less than prescribed  Tell your provider right away if you forget to take your blood thinner, or if you take too much  ? Warfarin  is a blood thinner that you may need to take  The following are things you should be aware of if you take warfarin:     § Foods and medicines can affect the amount of warfarin in your blood  Do not make major changes to your diet while you take warfarin  Warfarin works best when you eat about the same amount of vitamin K every day  Vitamin K is found in green leafy vegetables and certain other foods  Ask for more information about what to eat when you are taking warfarin  § You will need to see your healthcare provider for follow-up visits when you are on warfarin  You will need regular blood tests  These tests are used to decide how much medicine you need  · Medicines  may be given to treat health conditions that increase the risk for a stroke  Examples are high cholesterol, high blood pressure, and diabetes  · Take your medicine as directed    Contact your healthcare provider if you think your medicine is not helping or if you have side effects  Tell him or her if you are allergic to any medicine  Keep a list of the medicines, vitamins, and herbs you take  Include the amounts, and when and why you take them  Bring the list or the pill bottles to follow-up visits  Carry your medicine list with you in case of an emergency  Recovery testing: Your healthcare provider will test your recovery 90 days (3 months) after your stroke  This may be done over the phone or in person  Your provider will ask how well you can do the activities you did before the stroke  He or she will also ask how well you can do your daily activities without help  Your provider may make recommendations for you based on your test  For example, you may need someone to help you walk safely  You may also need help with daily activities, such as getting dressed  Based on your answers, your provider may do this test again over time  Manage an ischemic stroke:   · Go to stroke rehabilitation (rehab) if directed  Rehab is a program run by specialists who will help you recover abilities you may have lost  Specialists include physical, occupational, and speech therapists  Physical therapists help you gain strength or keep your balance  Occupational therapists teach you new ways to do daily activities, such as getting dressed  Your therapy may include movements for everyday activities  An example is being able to raise yourself from a chair  A speech therapist helps you improve your ability to talk and swallow  · Wear pressure stockings as directed  Pressure stockings help keep blood from pooling in your leg veins  Your healthcare provider can prescribe stockings that are right for you  Do not buy over-the-counter pressure stockings unless your healthcare provider says it is okay  They may not fit correctly or may have elastic that cuts off your circulation  Ask your healthcare provider when to start wearing pressure stockings and how long to wear them each day  · Make your home safe  Remove anything you might trip over  Tape electrical cords down  Keep paths clear throughout your home  Make sure your home is well lit  Put nonslip materials on surfaces that might be slippery  An example is your bathtub or shower floor  A cane or walker may help you keep your balance as you walk  What you can do to prevent another stroke:   · Manage health conditions  A condition such as diabetes can increase your risk for a stroke  Control your blood sugar level if you have hyperglycemia or diabetes  Take your prescribed medicines and check your blood sugar level as directed  · Check your blood pressure as directed  High blood pressure can increase your risk for a stroke  If you have high blood pressure, follow your healthcare provider's directions for controlling it  · Do not use nicotine products or illegal drugs  Nicotine and other chemicals in cigarettes and cigars can cause blood vessel damage  Nicotine and illegal drugs both increase your risk for a stroke  Ask your healthcare provider for information if you currently smoke or use drugs and need help to quit  E- cigarettes or smokeless tobacco still contain nicotine  Talk to your healthcare provider before you use these products  · Talk to your healthcare provider about alcohol  Alcohol can raise your blood pressure  The recommended limit is 2 drinks in a day for men and 1 drink in a day for women  Do not binge drink or save a week's worth of alcohol to drink in 1 or 2 days  Limit weekly amounts as directed by your provider  · Eat a variety of healthy foods  Healthy foods include whole-grain breads, low-fat dairy products, beans, lean meats, and fish  Eat at least 5 servings of fruits and vegetables each day  Choose foods that are low in fat, cholesterol, salt, and sugar  Eat foods that are high in potassium, such as potatoes and bananas  A dietitian can help you create healthy meal plans  · Maintain a healthy weight  Ask your healthcare provider how much you should weigh  Ask him or her to help you create a weight loss plan if you are overweight  He or she can help you create small goals if you have a lot of weight to lose  · Exercise as directed  Exercise can lower your blood pressure, cholesterol, weight, and blood sugar levels  Healthcare providers will help you create exercise goals  They can also help you make a plan to reach your goals  For example, you can break exercise into 10 minute periods, 3 times in the day  Find an exercise that you enjoy  This will make it easier for you to reach your exercise goals  · Manage stress  Stress can raise your blood pressure  Find new ways to relax, such as deep breathing or listening to music  · Talk to your healthcare provider about risk factors for women  Birth control pills increase your risk, especially if you are older than 35 or smoke cigarettes  Talk to your healthcare provider about other forms of contraception  Estrogen levels drop during menopause  Low estrogen levels may increase your risk for stroke  Talk to your healthcare provider about hormone replacement therapy to reduce your risk for a stroke  What you need to know about depression after a stroke:  Talk to your healthcare provider if you have depression that continues or is getting worse  Your provider may be able to help treat your depression  Your provider can also recommend support groups for you to join  A support group is a place to talk with others who have had a stroke  It may also help to talk to friends and family members about how you are feeling   Tell your family and friends to let your healthcare provider know if they see any signs of depression:  · Extreme sadness    · Avoiding social interaction with family or friends    · A lack of interest in things you once enjoyed    · Irritability    · Trouble sleeping    · Low energy levels    · A change in eating habits or sudden weight gain or loss    Follow up with your doctor or neurologist as directed: You may need to come in over time for brain function or blood tests  Your provider may refer you to a specialist, or to other kinds of care  An example is palliative (comfort) care  Your provider can also give information about respite care to anyone who helps care for you  Respite care is a service to help caregivers take a break or get more rest  Write down your questions so you remember to ask them during your visits  For support and more information:   · National St[employer] Association  [address] , [place] [address]  Phone: 8- 750 - 912-4224  Web Address: [employer] [address]  [address] Information is for End User's use only and may not be sold, redistributed or otherwise used for commercial purposes  All illustrations and images included in CareNotes® are the copyrighted property of [employer] , [employer]  or [address] [place], [place] [address]   The above information is an  only  It is not intended as medical advice for individual conditions or treatments  Talk to your doctor, nurse or pharmacist before following any medical regimen to see if it is safe and effective for you

## 2021-06-04 NOTE — CONSULTS
Consultation - Neurology   Domingo Scanlon 64 y o  female MRN: [id number]  Unit/Bed#: -01 Encounter: [id number]      Assessment/Plan     Left thalamic infarction [facility])  Assessment & Plan  64year old female with HTN, DM who presented to the hospital with complaints of intermittent R sided numbness  She underwent CTA head and neck which demonstrated right P2 segment high-grade stenosis/occlusion and also long segment left P2 segment occlusion  MRI brain completed demonstrates subacute left posterolateral thalamic lacunar infarction  Plan:   - Continue on ASA 81 mg QD and Plavix 75 mg QD x 3 months given intracranial stenosis noted  After 3 months, would recommend discontinue Plavix and continue on ASA 81 mg QD monotherapy  - Continue on Lipitor, would decrease dose to 40 mg QPM    - Hemoglobin A1c reviewed, 6 4   - Fasting lipid panel reviewed, total cholesterol 174, triglycerides 84, HDL 75, LDL 82   - Echocardiogram pending    - Goal normothermia, normotension, euglycemia  - Stroke education    - Patient will need outpatient stroke follow-up  Appointment has been requested with office  Hypertension  Assessment & Plan  - Goal normotension    - Management as per medicine team  Discussed with them today that patient has been noncompliant with Lisinopril at home and they will discuss additional treatment options with her today  - BP needs to be well-controlled and patient recommended to monitor her BP at home  Domingo Scanlon will need follow up in in 6 weeks with general attending or advance practitioner  She will not require outpatient neurological testing  Patient was seen by Dr Tiesha Rose in the hospital and may follow-up with him  History of Present Illness     Reason for Consult / Principal Problem: R sided numbness  HPI: Domingo Scanlon is a 64 y o  left handed female with HTN, DM, s/p gastric bypass surgery who presents with complaints of intermittent R sided numbness   To review, patient was seen by Dr Mehnaz Caicedo in the outpatient neurology setting in 2019 with complaints of bumbness and tingling of her B/L hands  She underwent EMG at that time which demonstrated B/L moderate median nerve compression neuropathy  She also underwent MRI brain and carotid ultrasound which were unremarkable  She was instructed to wear wrist splints at night and also to see orthopedic surgery for evaluation  Yesterday, she presented to the hospital with complaints of R sided numbness involving face, arm and leg  She notes that the symptoms started on Tuesday  She notes she went to work that day and the numbness continued to be intermittent  She notes that it continued on Wednesday and yesterday when she woke up, she continued to have the symptoms and she took two ASA 81 mg tablets and came to the ED  She notes that she had taken her BP at home and it was elevated  She notes that she has not been taking her Lisinopril because she felt it made her dizzy  She was noted to be hypertensive with SBP >200 on admission and also underwent CTA head and neck which demonstrated R P2 segment high grade critical stenosis vs occlusion as well as long segment L P2 segment occlusion  She was given  mg and Plavix 300 mg  She was admitted to the hospital for further evaluation and MRI brain  She notes that she feels better and currently denies any numbness but notes that at times she will feel the numbness on her R side  She denies any other associated symptoms and notes that she has never had similar symptoms before  Inpatient consult to Neurology  Consult performed by: Yony Kitchen PA-C  Consult ordered by: Deshawn Collins MD          Review of Systems   Constitutional: Negative for chills, fatigue and fever  HENT: Negative for trouble swallowing  Eyes: Negative for visual disturbance  Respiratory: Negative for shortness of breath  Cardiovascular: Negative for chest pain     Gastrointestinal: Negative for abdominal pain, nausea and vomiting  Genitourinary: Negative for difficulty urinating and dysuria  Musculoskeletal: Negative for back pain, gait problem and neck pain  Skin: Negative for rash  Neurological: Positive for numbness  Negative for dizziness, speech difficulty, weakness, light-headedness and headaches  Psychiatric/Behavioral: Negative for confusion  Historical Information   Past Medical History:   Diagnosis Date    Asthma     Diabetes mellitus ([facility] [address] )     Hypertension      Past Surgical History:   Procedure Laterality Date    CHOLECYSTECTOMY      GASTRIC BYPASS      HERNIA REPAIR      TUBAL LIGATION       Social History   Social History     Substance and Sexual Activity   Alcohol Use Not Currently    Frequency: Never    Comment: occ     Social History     Substance and Sexual Activity   Drug Use No     E-Cigarette/Vaping     E-Cigarette/Vaping Substances     Social History     Tobacco Use   Smoking Status Never Smoker   Smokeless Tobacco Never Used     Family History:   Family History   Problem Relation Age of Onset    Hypertension Mother     Arthritis Mother     Diabetes Father     Hypertension Father     Diabetes Sister     Hypertension Sister     Diabetes Brother     Hypertension Brother     Hypertension Sister     Diabetes Sister        Review of previous medical records was completed, please see HPI       Meds/Allergies   Scheduled Meds:  Current Facility-Administered Medications   Medication Dose Route Frequency Provider Last Rate    aspirin  81 mg Oral Daily Renae Cortez MD      atorvastatin  80 mg Oral QPM Renae Cortez MD      budesonide-formoterol  2 puff Inhalation BID Renae Cortez MD      chlorthalidone  50 mg Oral Daily Renae Cortez MD      clopidogrel  75 mg Oral Daily Renae Cortez MD      enoxaparin  40 mg Subcutaneous Daily Renae Cortez MD      hydrALAZINE  5 mg Intravenous Q4H PRN Renae Cortez MD      Labetalol HCl  20 mg Intravenous Once Carley Ronquillo MD      lisinopril  10 mg Oral Daily Dulce Cruz MD      pantoprazole  40 mg Oral Early Morning Dulce Cruz MD      potassium chloride  40 mEq Oral Once Juan Horse, CRNP       Continuous Infusions:   PRN Meds: hydrALAZINE      Allergies   Allergen Reactions    Anesthesia S-I-40 [Propofol] Rash       Objective   Vitals:Blood pressure (!) 174/88, pulse 60, temperature 98 °F (36 7 °C), temperature source Oral, resp  rate 16, height 5' 3" (1 6 m), weight 95 7 kg (211 lb), SpO2 98 %  ,Body mass index is 37 38 kg/m²  Intake/Output Summary (Last 24 hours) at 6/4/2021 0803  Last data filed at 6/4/2021 0300  Gross per 24 hour   Intake --   Output 200 ml   Net -200 ml       Invasive Devices: Invasive Devices     Peripheral Intravenous Line            Peripheral IV 06/03/21 Distal;Left;Upper;Ventral (anterior) Arm less than 1 day                Physical Exam  Constitutional:       General: She is not in acute distress  Appearance: Normal appearance  She is not ill-appearing, toxic-appearing or diaphoretic  HENT:      Head: Normocephalic and atraumatic  Mouth/Throat:      Mouth: Mucous membranes are moist       Pharynx: Oropharynx is clear  No oropharyngeal exudate or posterior oropharyngeal erythema  Eyes:      General: No scleral icterus  Right eye: No discharge  Left eye: No discharge  Extraocular Movements: Extraocular movements intact  Conjunctiva/sclera: Conjunctivae normal       Pupils: Pupils are equal, round, and reactive to light  Neck:      Musculoskeletal: Normal range of motion and neck supple  Cardiovascular:      Rate and Rhythm: Normal rate and regular rhythm  Pulmonary:      Effort: Pulmonary effort is normal  No respiratory distress  Breath sounds: Normal breath sounds  Abdominal:      General: There is no distension  Palpations: Abdomen is soft  Tenderness: There is no abdominal tenderness  Musculoskeletal: Normal range of motion  Right lower leg: No edema  Left lower leg: No edema  Skin:     General: Skin is warm and dry  Findings: No erythema or rash  Neurological:      Mental Status: She is alert and oriented to person, place, and time  Coordination: Finger-Nose-Finger Test normal       Deep Tendon Reflexes:      Reflex Scores:       Bicep reflexes are 2+ on the right side and 2+ on the left side  Brachioradialis reflexes are 2+ on the right side and 2+ on the left side  Patellar reflexes are 2+ on the right side and 2+ on the left side  Achilles reflexes are 2+ on the right side and 2+ on the left side  Psychiatric:         Mood and Affect: Mood normal          Speech: Speech normal        Neurologic Exam     Mental Status   Oriented to person, place, and time  Oriented to person  Oriented to place  Oriented to time  Attention: normal  Concentration: normal    Speech: speech is normal   Level of consciousness: alert  Knowledge: good  Able to name object  Able to repeat  Normal comprehension  Cranial Nerves     CN II   Right visual field deficit: none  Left visual field deficit: none     CN III, IV, VI   Pupils are equal, round, and reactive to light  Right pupil: Size: 4 mm  Shape: regular  Reactivity: brisk  Consensual response: intact  Left pupil: Size: 4 mm  Shape: regular  Reactivity: brisk  Consensual response: intact     Nystagmus: none   Diplopia: none  Ophthalmoparesis: none  Upgaze: normal  Downgaze: normal  Conjugate gaze: present    CN V   Right facial sensation deficit: none  Left facial sensation deficit: none    CN VII   Right facial weakness: none  Left facial weakness: none    CN VIII   Hearing: intact    CN IX, X   Palate: symmetric    CN XI   Right sternocleidomastoid strength: normal  Left sternocleidomastoid strength: normal    CN XII   Tongue: not atrophic  Fasciculations: absent  Tongue deviation: none    Motor Exam Muscle bulk: normal  Overall muscle tone: normal  Right arm tone: normal  Left arm tone: normal  Right arm pronator drift: absent  Left arm pronator drift: absent  Right leg tone: normal  Left leg tone: normalMotor strength 5/5 B/L UE and LE     Sensory Exam   Right arm light touch: normal  Left arm light touch: normal  Right leg light touch: normal  Left leg light touch: normal  Right arm pinprick: normal  Left arm pinprick: normal  Right leg pinprick: normal  Left leg pinprick: normal    Gait, Coordination, and Reflexes     Coordination   Finger to nose coordination: normal    Tremor   Resting tremor: absent  Intention tremor: absent  Action tremor: absent    Reflexes   Right brachioradialis: 2+  Left brachioradialis: 2+  Right biceps: 2+  Left biceps: 2+  Right patellar: 2+  Left patellar: 2+  Right achilles: 2+  Left achilles: 2+  Right plantar: normal  Left plantar: normalGait deferred secondary to fall risk         Lab Results:   Recent Results (from the past 24 hour(s))   CBC and differential    Collection Time: 06/03/21  4:31 PM   Result Value Ref Range    WBC 4 81 4 31 - 10 16 Thousand/uL    RBC 4 88 3 81 - 5 12 Million/uL    Hemoglobin 12 6 11 5 - 15 4 g/dL    Hematocrit 39 8 34 8 - 46 1 %    MCV 82 82 - 98 fL    MCH 25 8 (L) 26 8 - 34 3 pg    MCHC 31 7 31 4 - 37 4 g/dL    RDW 14 9 11 6 - 15 1 %    MPV 11 2 8 9 - 12 7 fL    Platelets 422 587 - 904 Thousands/uL    nRBC 0 /100 WBCs    Neutrophils Relative 32 (L) 43 - 75 %    Immat GRANS % 0 0 - 2 %    Lymphocytes Relative 53 (H) 14 - 44 %    Monocytes Relative 10 4 - 12 %    Eosinophils Relative 4 0 - 6 %    Basophils Relative 1 0 - 1 %    Neutrophils Absolute 1 55 (L) 1 85 - 7 62 Thousands/µL    Immature Grans Absolute 0 01 0 00 - 0 20 Thousand/uL    Lymphocytes Absolute 2 52 0 60 - 4 47 Thousands/µL    Monocytes Absolute 0 50 0 17 - 1 22 Thousand/µL    Eosinophils Absolute 0 18 0 00 - 0 61 Thousand/µL    Basophils Absolute 0 05 0 00 - 0 10 Thousands/µL Comprehensive metabolic panel    Collection Time: 06/03/21  4:31 PM   Result Value Ref Range    Sodium 142 136 - 145 mmol/L    Potassium 4 0 3 5 - 5 3 mmol/L    Chloride 107 100 - 108 mmol/L    CO2 30 21 - 32 mmol/L    ANION GAP 5 4 - 13 mmol/L    BUN 15 5 - 25 mg/dL    Creatinine 0 91 0 60 - 1 30 mg/dL    Glucose 102 65 - 140 mg/dL    Calcium 8 5 8 3 - 10 1 mg/dL    Corrected Calcium 9 0 8 3 - 10 1 mg/dL    AST 21 5 - 45 U/L    ALT 25 12 - 78 U/L    Alkaline Phosphatase 119 (H) 46 - 116 U/L    Total Protein 7 0 6 4 - 8 2 g/dL    Albumin 3 4 (L) 3 5 - 5 0 g/dL    Total Bilirubin 0 23 0 20 - 1 00 mg/dL    eGFR 71 ml/min/1 73sq m   Troponin I    Collection Time: 06/03/21  4:31 PM   Result Value Ref Range    Troponin I <0 02 <=0 04 ng/mL   ECG 12 lead    Collection Time: 06/03/21  4:31 PM   Result Value Ref Range    Ventricular Rate 57 BPM    Atrial Rate 57 BPM    WI Interval 166 ms    QRSD Interval 78 ms    QT Interval 462 ms    QTC Interval 449 ms    P [place] 42 degrees    QRS Axis 38 degrees    T Wave Axis 1 degrees   UA (URINE) with reflex to Scope    Collection Time: 06/03/21  7:00 PM   Result Value Ref Range    Color, UA Yellow     Clarity, UA Clear     Specific Gravity, UA <=1 005 1 003 - 1 030    pH, UA 5 5 4 5, 5 0, 5 5, 6 0, 6 5, 7 0, 7 5, 8 0    Leukocytes, UA Negative Negative    Nitrite, UA Negative Negative    Protein, UA Negative Negative mg/dl    Glucose, UA Negative Negative mg/dl    Ketones, UA Negative Negative mg/dl    Urobilinogen, UA 0 2 0 2, 1 0 E U /dl E U /dl    Bilirubin, UA Negative Negative    Blood, UA Negative Negative   Lipid Panel with Direct LDL reflex    Collection Time: 06/04/21  4:16 AM   Result Value Ref Range    Cholesterol 174 50 - 200 mg/dL    Triglycerides 84 <=150 mg/dL    HDL, Direct 75 >=40 mg/dL    LDL Calculated 82 0 - 100 mg/dL   Basic metabolic panel    Collection Time: 06/04/21  4:16 AM   Result Value Ref Range    Sodium 141 136 - 145 mmol/L    Potassium 3 4 (L) 3 5 - 5 3 mmol/L    Chloride 104 100 - 108 mmol/L    CO2 26 21 - 32 mmol/L    ANION GAP 11 4 - 13 mmol/L    BUN 10 5 - 25 mg/dL    Creatinine 0 67 0 60 - 1 30 mg/dL    Glucose 111 65 - 140 mg/dL    Calcium 8 7 8 3 - 10 1 mg/dL    eGFR 99 ml/min/1 73sq m   Magnesium    Collection Time: 06/04/21  4:16 AM   Result Value Ref Range    Magnesium 2 2 1 6 - 2 6 mg/dL   Hepatic function panel    Collection Time: 06/04/21  4:16 AM   Result Value Ref Range    Total Bilirubin 0 31 0 20 - 1 00 mg/dL    Bilirubin, Direct 0 11 0 00 - 0 20 mg/dL    Alkaline Phosphatase 113 46 - 116 U/L    AST 15 5 - 45 U/L    ALT 20 12 - 78 U/L    Total Protein 6 5 6 4 - 8 2 g/dL    Albumin 3 2 (L) 3 5 - 5 0 g/dL   CBC (With Platelets)    Collection Time: 06/04/21  4:16 AM   Result Value Ref Range    WBC 3 91 (L) 4 31 - 10 16 Thousand/uL    RBC 4 89 3 81 - 5 12 Million/uL    Hemoglobin 12 6 11 5 - 15 4 g/dL    Hematocrit 39 3 34 8 - 46 1 %    MCV 80 (L) 82 - 98 fL    MCH 25 8 (L) 26 8 - 34 3 pg    MCHC 32 1 31 4 - 37 4 g/dL    RDW 14 9 11 6 - 15 1 %    Platelets 848 941 - 817 Thousands/uL    MPV 11 1 8 9 - 12 7 fL       Imaging Studies: I have personally reviewed pertinent reports  and I have personally reviewed pertinent films in PACS  CTA head and neck with and without contrast- No evidence of acute intracranial hemorrhage  Microangiopathy  Right P2 segment high-grade 5 mm length critical stenosis/occlusion  Long segment left P2 segment occlusion  Consider follow-up noncontrast brain MRI  MRI brain without contrast- Probable subacute left posterolateral thalamic lacunar infarction  Abnormal PCA flow voids in keeping with the CTA defined B/L P2 segment high-grade stenosis/occlusion      VTE Prophylaxis: Enoxaparin (Lovenox)

## 2021-06-04 NOTE — NURSING NOTE
Pt was seen and assessed at the bedside post CT contrast extravasation that occurred yesterday 6/3/21  Pt sitting comfortably in a chair, RFA site appears less swollen, small area of redness and ecchymosis  No signs of vascular compromise  Instructions, form and s/s to watch for were reviewed and pt verbalized understanding  Pt was provided with a cold pack and pillow  Pt denied any other needs or concerns at this time

## 2021-06-04 NOTE — SPEECH THERAPY NOTE
Consult received and chart reviewed  Per chart review, and discussion with RN, pt passed RN dysphagia assessment and is tolerating regular diet with thin liquids with no s/s of aspiration  No dysarthria or language deficits noted or reported  Therefore, formal speech/swallow evaluation not indicated at this time  If new concerns arise, please reconsult       MICKIE Loza , Maureen  Pathologist   [phone]

## 2021-06-07 ENCOUNTER — TELEPHONE (OUTPATIENT)
Dept: INTERVENTIONAL RADIOLOGY/VASCULAR | Facility: HOSPITAL | Age: 56
End: 2021-06-07

## 2021-06-07 NOTE — UTILIZATION REVIEW
Notification of Discharge   This is a Notification of Discharge from our facility [address]  Please be advised that this patient has been discharge from our facility  Below you will find the admission and discharge date and time including the patients disposition  UTILIZATION REVIEW CONTACT:  Camilo Olson  Utilization   Network Utilization Review Department  Phone: [phone] x carefully listen to the prompts  All voicemails are confidential   Email: [email or web address]     PHYSICIAN ADVISORY SERVICES:  FOR TQNR-AN-NVYT REVIEW - MEDICAL NECESSITY DENIAL  Phone: [phone]  Fax: [phone]  Email: [email or web address]     PRESENTATION DATE: 6/3/2021  3:55 PM  OBERVATION ADMISSION DATE: 06/03/2021  INPATIENT ADMISSION DATE: 6/3/21 1933   DISCHARGE DATE: 6/4/2021  4:58 PM  DISPOSITION: Home/Self Care Home/Self Care      IMPORTANT INFORMATION:  Send all requests for admission clinical reviews, approved or denied determinations and any other requests to dedicated fax number below belonging to the campus where the patient is receiving treatment   List of dedicated fax numbers:  [address] DENIALS (Administrative/Medical Necessity) [phone]   [address] (Maternity/NICU/Pediatrics) [phone]   Nima Crenshaw [phone]   Marco Croft [phone]   Lilia Lawler [phone]   [facility] [address] [phone]   [facility]  [phone]   [address] [place] [address], [address]  [address] [phone]

## 2021-06-08 ENCOUNTER — TELEPHONE (OUTPATIENT)
Dept: INTERVENTIONAL RADIOLOGY/VASCULAR | Facility: HOSPITAL | Age: 56
End: 2021-06-08

## 2021-06-11 ENCOUNTER — TELEPHONE (OUTPATIENT)
Dept: NEUROLOGY | Facility: CLINIC | Age: 56
End: 2021-06-11

## 2021-06-11 NOTE — TELEPHONE ENCOUNTER
1st Attempt  Left message for patient to schedule her hospital follow up  SLMO/Right sided numbness/Verify Ins    Notes from chart:  Domingo Scanlon will need follow up in in 6 weeks with general attending or advance practitioner  She will not require outpatient neurological testing  Patient was seen by Dr Tiesha Rose in the hospital and may follow-up with him       *Patient already established with Dr Mehnaz Caicedo

## 2021-09-10 ENCOUNTER — HOSPITAL ENCOUNTER (EMERGENCY)
Facility: HOSPITAL | Age: 56
Discharge: HOME | End: 2021-09-10
Attending: EMERGENCY MEDICINE | Admitting: EMERGENCY MEDICINE
Payer: COMMERCIAL

## 2021-09-10 VITALS
OXYGEN SATURATION: 98 % | TEMPERATURE: 98.6 F | BODY MASS INDEX: 34.54 KG/M2 | SYSTOLIC BLOOD PRESSURE: 109 MMHG | RESPIRATION RATE: 17 BRPM | DIASTOLIC BLOOD PRESSURE: 56 MMHG | HEART RATE: 90 BPM | WEIGHT: 195 LBS

## 2021-09-10 DIAGNOSIS — T78.40XA ACUTE ALLERGIC REACTION, INITIAL ENCOUNTER: ICD-10-CM

## 2021-09-10 DIAGNOSIS — T78.2XXA ANAPHYLAXIS, INITIAL ENCOUNTER: Primary | ICD-10-CM

## 2021-09-10 LAB
ATRIAL RATE: 88 BPM
P AXIS: 52 DEGREES
PR INTERVAL: 156 MS
QRS AXIS: 4 DEGREES
QRSD INTERVAL: 84 MS
QT INTERVAL: 368 MS
QTC INTERVAL: 445 MS
T WAVE AXIS: 64 DEGREES
VENTRICULAR RATE: 88 BPM

## 2021-09-10 PROCEDURE — 96361 HYDRATE IV INFUSION ADD-ON: CPT

## 2021-09-10 PROCEDURE — 94640 AIRWAY INHALATION TREATMENT: CPT

## 2021-09-10 PROCEDURE — 96375 TX/PRO/DX INJ NEW DRUG ADDON: CPT

## 2021-09-10 PROCEDURE — 99283 EMERGENCY DEPT VISIT LOW MDM: CPT

## 2021-09-10 PROCEDURE — 99284 EMERGENCY DEPT VISIT MOD MDM: CPT | Performed by: EMERGENCY MEDICINE

## 2021-09-10 PROCEDURE — 96372 THER/PROPH/DIAG INJ SC/IM: CPT

## 2021-09-10 PROCEDURE — 96374 THER/PROPH/DIAG INJ IV PUSH: CPT

## 2021-09-10 PROCEDURE — 93010 ELECTROCARDIOGRAM REPORT: CPT | Performed by: INTERNAL MEDICINE

## 2021-09-10 PROCEDURE — 93005 ELECTROCARDIOGRAM TRACING: CPT

## 2021-09-10 RX ORDER — IPRATROPIUM BROMIDE AND ALBUTEROL SULFATE 2.5; .5 MG/3ML; MG/3ML
3 SOLUTION RESPIRATORY (INHALATION) ONCE
Status: COMPLETED | OUTPATIENT
Start: 2021-09-10 | End: 2021-09-10

## 2021-09-10 RX ORDER — PREDNISONE 20 MG/1
40 TABLET ORAL DAILY
Qty: 8 TABLET | Refills: 0 | Status: SHIPPED | OUTPATIENT
Start: 2021-09-11 | End: 2021-09-15

## 2021-09-10 RX ORDER — EPINEPHRINE 1 MG/ML
0.3 INJECTION, SOLUTION, CONCENTRATE INTRAVENOUS ONCE
Status: COMPLETED | OUTPATIENT
Start: 2021-09-10 | End: 2021-09-10

## 2021-09-10 RX ORDER — METHYLPREDNISOLONE SODIUM SUCCINATE 125 MG/2ML
125 INJECTION, POWDER, LYOPHILIZED, FOR SOLUTION INTRAMUSCULAR; INTRAVENOUS ONCE
Status: COMPLETED | OUTPATIENT
Start: 2021-09-10 | End: 2021-09-10

## 2021-09-10 RX ORDER — EPINEPHRINE 0.3 MG/.3ML
0.3 INJECTION SUBCUTANEOUS ONCE
Qty: 0.6 ML | Refills: 0 | Status: SHIPPED | OUTPATIENT
Start: 2021-09-10 | End: 2021-09-10

## 2021-09-10 RX ORDER — ONDANSETRON 2 MG/ML
INJECTION INTRAMUSCULAR; INTRAVENOUS
Status: COMPLETED
Start: 2021-09-10 | End: 2021-09-10

## 2021-09-10 RX ORDER — ONDANSETRON 2 MG/ML
4 INJECTION INTRAMUSCULAR; INTRAVENOUS ONCE
Status: COMPLETED | OUTPATIENT
Start: 2021-09-10 | End: 2021-09-10

## 2021-09-10 RX ORDER — EPINEPHRINE 1 MG/ML
INJECTION, SOLUTION, CONCENTRATE INTRAVENOUS
Status: COMPLETED
Start: 2021-09-10 | End: 2021-09-10

## 2021-09-10 RX ADMIN — METHYLPREDNISOLONE SODIUM SUCCINATE 125 MG: 125 INJECTION, POWDER, FOR SOLUTION INTRAMUSCULAR; INTRAVENOUS at 15:12

## 2021-09-10 RX ADMIN — EPINEPHRINE 0.3 MG: 1 INJECTION, SOLUTION, CONCENTRATE INTRAVENOUS at 15:04

## 2021-09-10 RX ADMIN — IPRATROPIUM BROMIDE AND ALBUTEROL SULFATE 3 ML: 2.5; .5 SOLUTION RESPIRATORY (INHALATION) at 15:15

## 2021-09-10 RX ADMIN — FAMOTIDINE 20 MG: 10 INJECTION INTRAVENOUS at 15:11

## 2021-09-10 RX ADMIN — ONDANSETRON 4 MG: 2 INJECTION INTRAMUSCULAR; INTRAVENOUS at 15:08

## 2021-09-10 RX ADMIN — SODIUM CHLORIDE 1000 ML: 0.9 INJECTION, SOLUTION INTRAVENOUS at 15:08

## 2021-09-10 NOTE — ED PROVIDER NOTES
Pt Name: Quinton Cartagena  MRN: [id number]  [place] 1965  Age/Sex: 64 y o  female  Date of evaluation: 9/10/2021  PCP: Sachin Riley MD    [address] [place] [address]    Chief Complaint   Patient presents with    Allergic Reaction     pt states she had a lunch at [employer] and is now c/o hives all over and eye swelling  HPI    64 y o  female presenting with allergic reaction  Patient has multiple food allergies  She ate a chili bowl at [employer], and then afterwards started developing hives all over her body, having difficulty breathing and her eyes were swollen  She also has nausea  Family member took her to a pharmacy, they bought Benadryl and she took 50 mg of p o  Benadryl  And then brought her to the emergency department  No chest pain or abdominal pain, no throat swelling  Past Medical and Surgical History    Past Medical History:   Diagnosis Date    Asthma     Diabetes mellitus ([address] )     Hypertension        Past Surgical History:   Procedure Laterality Date    CHOLECYSTECTOMY      GASTRIC BYPASS      HERNIA REPAIR      TUBAL LIGATION         Family History   Problem Relation Age of Onset    Hypertension Mother    Areta Emmer Arthritis Mother     Diabetes Father     Hypertension Father     Diabetes Sister     Hypertension Sister     Diabetes Brother     Hypertension Brother     Hypertension Sister     Diabetes Sister        Social History     Tobacco Use    Smoking status: Never Smoker    Smokeless tobacco: Never Used   Substance Use Topics    Alcohol use: Not Currently     Comment: occ    Drug use: No           Allergies    Allergies   Allergen Reactions    Pickled Meat Swelling     Lips swell    Shellfish-Derived Products - Food Allergy Swelling and Other (See Comments)     Patient states "stuffed clams made my throat itch and swell  I felt like I couldn't breathe"  Also ears, nose itchy and watery eyes   Patient states she eats lobster, shrimp and crab without any reaction    Coconut Oil - Food Allergy Other (See Comments)    Kiwi Extract - Food Allergy Other (See Comments)    Pineapple - Food Allergy Cough and Other (See Comments)     "throat, ears, nose gets itchy-I had to take benadryl" also watery eyes    Pollen Extract Cough and Other (See Comments)     Kiwi, coconuts, pineapple, taco sauce-"throat, ears, nose gets itchy-I had to take benadryl" also watery eyes    Penicillins Rash    Propofol Rash and Other (See Comments)     Patient states " after my gastric bypass surgery I got very itchy on my chest"        Home Medications    Prior to Admission medications    Medication Sig Start Date End Date Taking? Authorizing Provider   aspirin 81 mg chewable tablet Chew 1 tablet (81 mg total) daily  Patient not taking: Reported on 8/19/2019 1/22/19   Clarke Montalvo PA-C   atorvastatin (LIPITOR) 40 mg tablet Take 1 tablet (40 mg total) by mouth every evening 6/4/21 7/4/21  RANDY Carslon   budesonide-formoterol [facility]) 160-4 5 mcg/act inhaler Inhale 2 puffs 2 (two) times a day Rinse mouth after use  Historical Provider, MD   chlorthalidone (HYGROTEN) 50 MG tablet Take 50 mg by mouth daily    Historical Provider, MD   clopidogrel (PLAVIX) 75 mg tablet Take 1 tablet (75 mg total) by mouth daily 6/5/21 7/5/21  RANDY Carlson   indomethacin (INDOCIN) 50 mg capsule Take 1 capsule (50 mg total) by mouth 2 (two) times a day with meals 9/28/20   Harrison Lizama PA-C   lisinopril (ZESTRIL) 10 mg tablet Take 1 tablet (10 mg total) by mouth daily 6/5/21 7/5/21  RANDY Carlson   pantoprazole (PROTONIX) 40 mg tablet Take 1 tablet (40 mg total) by mouth daily in the early morning 6/5/21 7/5/21  RANDY Carlson           Review of Systems    Review of Systems   Constitutional: Negative for chills and fever  HENT: Positive for facial swelling  Negative for rhinorrhea and sore throat      Eyes: Negative for pain and visual disturbance  Respiratory: Positive for shortness of breath and wheezing  Negative for cough  Cardiovascular: Negative for chest pain and leg swelling  Gastrointestinal: Negative for abdominal pain, nausea and vomiting  Genitourinary: Negative for dysuria and hematuria  Musculoskeletal: Negative for back pain and myalgias  Skin: Positive for rash  Negative for wound  Neurological: Negative for syncope and headaches  Physical Exam      ED Triage Vitals [09/10/21 1449]   Temperature Pulse Respirations Blood Pressure SpO2   98 6 °F (37 °C) 100 17 165/78 96 %      Temp Source Heart Rate Source Patient Position - Orthostatic VS BP Location FiO2 (%)   Oral Monitor Sitting Left arm --      Pain Score       --               Physical Exam  Constitutional:       General: She is not in acute distress  Appearance: She is not diaphoretic  HENT:      Head: Normocephalic and atraumatic  Nose: Nose normal       Mouth/Throat:      Mouth: Mucous membranes are moist    Eyes:      Extraocular Movements: Extraocular movements intact  Pupils: Pupils are equal, round, and reactive to light  Comments: Periorbital edema   Cardiovascular:      Rate and Rhythm: Normal rate and regular rhythm  Pulmonary:      Effort: No respiratory distress  Breath sounds: Normal breath sounds  Comments: Mild diffuse wheezing  Abdominal:      General: There is no distension  Palpations: Abdomen is soft  Tenderness: There is no abdominal tenderness  Musculoskeletal:         General: No swelling or deformity  Normal range of motion  Cervical back: Normal range of motion and neck supple  Skin:     General: Skin is warm  Comments: Urticaria over arms, torso and face   Neurological:      Mental Status: She is alert and oriented to person, place, and time  Mental status is at baseline                Diagnostic Results      Labs:    Results Reviewed     None          All labs reviewed and utilized in the medical decision making process    Radiology:    No orders to display       All radiology studies independently viewed by me and interpreted by the radiologist     Procedure    Procedures        MDM    EKG shows sinus rhythm heart rate of 88, narrow QRS, intervals reassuring, no ST elevation, no significant ST depression  Patient ED 89% on room air, concern for anaphylaxis, given EpiPen, IV Pepcid, IV Solu-Medrol  Give DuoNeb treatment and Zofran  Patient already took 50 mg of Benadryl prior to coming to the emergency department  Will re-evaluate afterwards  Upon re-evaluation, patient feeling much better, swelling has completely resolved, no shortness of breath, off of supplemental oxygen  Already has Benadryl at home  Provided prescriptions for EpiPen and prednisone  PCP follow-up recommended  Advised cessation no foods that she is allergic to  Return precautions discussed  Medications   EPINEPHrine PF (ADRENALIN) 1 mg/mL injection 0 3 mg (0 3 mg Intramuscular Given 9/10/21 1504)   famotidine (PEPCID) injection 20 mg (20 mg Intravenous Given 9/10/21 1511)   methylPREDNISolone sodium succinate (Solu-MEDROL) injection 125 mg (125 mg Intravenous Given 9/10/21 1512)   ondansetron (ZOFRAN) injection 4 mg (4 mg Intravenous Given 9/10/21 1508)   sodium chloride 0 9 % bolus 1,000 mL (0 mL Intravenous Stopped 9/10/21 1608)   ipratropium-albuterol (DUO-NEB) 0 5-2 5 mg/3 mL inhalation solution 3 mL (3 mL Nebulization Given 9/10/21 1515)           FINAL IMPRESSION    Final diagnoses:   Anaphylaxis, initial encounter   Acute allergic reaction, initial encounter         DISPOSITION    Time reflects when diagnosis was documented in both MDM as applicable and the Disposition within this note     Time User Action Codes Description Comment    9/10/2021  6:44 PM Consuelo Maldonado [facility]  2XXA] Anaphylaxis, initial encounter     9/10/2021  6:44 PM Waldemar Maldonado Add [T78 40XA] Acute allergic reaction, initial encounter       ED Disposition     ED Disposition Condition Date/Time Comment    Discharge Stable Fri Sep 10, 2021  6:44 PM Jefe Fairchild discharge to home/self care              Follow-up Information     Follow up With Specialties Details Why 502 Mikie Ellison MD Internal Medicine In 5 days  [address]  [phone]              PATIENT REFERRED TO:    Bartolome Buck MD  [address] [facility]  [phone]    In 5 days        DISCHARGE MEDICATIONS:    Discharge Medication List as of 9/10/2021  6:47 PM      START taking these medications    Details   EPINEPHrine (EPIPEN) 0 3 mg/0 3 mL SOAJ Inject 0 3 mL (0 3 mg total) into a muscle once for 1 dose, Starting Fri 9/10/2021, Normal      predniSONE 20 mg tablet Take 2 tablets (40 mg total) by mouth daily for 4 days, Starting Sat 9/11/2021, Until Wed 9/15/2021, Normal         CONTINUE these medications which have NOT CHANGED    Details   aspirin 81 mg chewable tablet Chew 1 tablet (81 mg total) daily, Starting Tue 1/22/2019, Print      atorvastatin (LIPITOR) 40 mg tablet Take 1 tablet (40 mg total) by mouth every evening, Starting Fri 6/4/2021, Until Sun 7/4/2021, Normal      budesonide-formoterol (SYMBICORT) 160-4 5 mcg/act inhaler Inhale 2 puffs 2 (two) times a day Rinse mouth after use , Historical Med      chlorthalidone (HYGROTEN) 50 MG tablet Take 50 mg by mouth daily, Historical Med      clopidogrel (PLAVIX) 75 mg tablet Take 1 tablet (75 mg total) by mouth daily, Starting Sat 6/5/2021, Until Mon 7/5/2021, Normal      indomethacin (INDOCIN) 50 mg capsule Take 1 capsule (50 mg total) by mouth 2 (two) times a day with meals, Starting Mon 9/28/2020, Normal      lisinopril (ZESTRIL) 10 mg tablet Take 1 tablet (10 mg total) by mouth daily, Starting Sat 6/5/2021, Until Mon 7/5/2021, Normal      pantoprazole (PROTONIX) 40 mg tablet Take 1 tablet (40 mg total) by mouth daily in the early morning, Starting Sat 6/5/2021, Until Mon 7/5/2021, Normal             No discharge procedures on file  Darius Douglas DO        This note was partially completed using voice recognition technology, and was scanned for gross errors; however some errors may still exist  Please contact the author with any questions or requests for clarification        Darius Douglas DO  09/13/21 [id number]

## 2022-11-21 ENCOUNTER — APPOINTMENT (EMERGENCY)
Dept: RADIOLOGY | Facility: HOSPITAL | Age: 57
End: 2022-11-21

## 2022-11-21 ENCOUNTER — HOSPITAL ENCOUNTER (EMERGENCY)
Facility: HOSPITAL | Age: 57
Discharge: HOME | End: 2022-11-21
Attending: EMERGENCY MEDICINE

## 2022-11-21 VITALS
HEART RATE: 112 BPM | DIASTOLIC BLOOD PRESSURE: 88 MMHG | SYSTOLIC BLOOD PRESSURE: 163 MMHG | TEMPERATURE: 100.7 F | OXYGEN SATURATION: 97 % | RESPIRATION RATE: 18 BRPM

## 2022-11-21 DIAGNOSIS — J11.1 INFLUENZA: Primary | ICD-10-CM

## 2022-11-21 LAB
FLUAV RNA RESP QL NAA+PROBE: POSITIVE
FLUBV RNA RESP QL NAA+PROBE: NEGATIVE
RSV RNA RESP QL NAA+PROBE: NEGATIVE
SARS-COV-2 RNA RESP QL NAA+PROBE: NEGATIVE

## 2022-11-21 RX ORDER — BENZONATATE 100 MG/1
100 CAPSULE ORAL EVERY 8 HOURS
Qty: 21 CAPSULE | Refills: 0 | Status: SHIPPED | OUTPATIENT
Start: 2022-11-21

## 2022-11-21 RX ORDER — ACETAMINOPHEN 325 MG/1
650 TABLET ORAL ONCE
Status: COMPLETED | OUTPATIENT
Start: 2022-11-21 | End: 2022-11-21

## 2022-11-21 RX ORDER — OSELTAMIVIR PHOSPHATE 75 MG/1
75 CAPSULE ORAL EVERY 12 HOURS
Qty: 10 CAPSULE | Refills: 0 | Status: SHIPPED | OUTPATIENT
Start: 2022-11-21 | End: 2022-11-26

## 2022-11-21 RX ORDER — DEXAMETHASONE 4 MG/1
6 TABLET ORAL ONCE
Status: COMPLETED | OUTPATIENT
Start: 2022-11-21 | End: 2022-11-21

## 2022-11-21 RX ADMIN — DEXAMETHASONE 6 MG: 4 TABLET ORAL at 20:21

## 2022-11-21 RX ADMIN — ACETAMINOPHEN 650 MG: 325 TABLET ORAL at 20:21

## 2022-11-21 NOTE — Clinical Note
Nithin Dejesus was seen and treated in our emergency department on 11/21/2022  Diagnosis: INFLUENZA A    Iris  may return to work on return date  She may return on this date: 11/26/2022         If you have any questions or concerns, please don't hesitate to call        Sandra Ferro MD    ______________________________           _______________          _______________  Hospital Representative                              Date                                Time

## 2022-11-22 NOTE — ED PROVIDER NOTES
History  Chief Complaint   Patient presents with   • Flu Symptoms     Pt reporting body aches, vomiting, cough x1 day     Patient started yesterday evening with sick symptoms  Positive other sick contacts at home with same kind of presentation  She has headache, cough, slight phlegm production  Pains all over  Sore throat  Past medical history hypertension, asthma, arthritis  Nonsmoker, drinks alcohol  Patient has a defibrillator      History provided by:  Patient   used: No    Flu Symptoms  Presenting symptoms: cough, headache, myalgias, shortness of breath and sore throat    Presenting symptoms: no fever and no vomiting    Associated symptoms: no chills and no ear pain        Prior to Admission Medications   Prescriptions Last Dose Informant Patient Reported? Taking? EPINEPHrine (EPIPEN) 0 3 mg/0 3 mL SOAJ   No No   Sig: Inject 0 3 mL (0 3 mg total) into a muscle once for 1 dose   aspirin 81 mg chewable tablet   No No   Sig: Chew 1 tablet (81 mg total) daily   Patient not taking: Reported on 8/19/2019   atorvastatin (LIPITOR) 40 mg tablet   No No   Sig: Take 1 tablet (40 mg total) by mouth every evening   budesonide-formoterol (SYMBICORT) 160-4 5 mcg/act inhaler   Yes No   Sig: Inhale 2 puffs 2 (two) times a day Rinse mouth after use     chlorthalidone (HYGROTEN) 50 MG tablet   Yes No   Sig: Take 50 mg by mouth daily   clopidogrel (PLAVIX) 75 mg tablet   No No   Sig: Take 1 tablet (75 mg total) by mouth daily   indomethacin (INDOCIN) 50 mg capsule   No No   Sig: Take 1 capsule (50 mg total) by mouth 2 (two) times a day with meals   lisinopril (ZESTRIL) 10 mg tablet   No No   Sig: Take 1 tablet (10 mg total) by mouth daily   pantoprazole (PROTONIX) 40 mg tablet   No No   Sig: Take 1 tablet (40 mg total) by mouth daily in the early morning      Facility-Administered Medications: None       Past Medical History:   Diagnosis Date   • Asthma    • Diabetes mellitus ([facility] [address] )    • Hypertension Past Surgical History:   Procedure Laterality Date   • CHOLECYSTECTOMY     • GASTRIC BYPASS     • HERNIA REPAIR     • TUBAL LIGATION         Family History   Problem Relation Age of Onset   • Hypertension Mother    • Arthritis Mother    • Diabetes Father    • Hypertension Father    • Diabetes Sister    • Hypertension Sister    • Diabetes Brother    • Hypertension Brother    • Hypertension Sister    • Diabetes Sister      I have reviewed and agree with the history as documented  E-Cigarette/Vaping     E-Cigarette/Vaping Substances     Social History     Tobacco Use   • Smoking status: Never   • Smokeless tobacco: Never   Substance Use Topics   • Alcohol use: Not Currently     Comment: occ   • Drug use: No       Review of Systems   Constitutional: Negative for chills and fever  HENT: Positive for sore throat  Negative for ear pain  Eyes: Negative for pain and visual disturbance  Respiratory: Positive for cough and shortness of breath  Cardiovascular: Negative for chest pain and palpitations  Gastrointestinal: Negative for abdominal pain and vomiting  Genitourinary: Negative for dysuria and hematuria  Musculoskeletal: Positive for myalgias  Negative for arthralgias and back pain  Skin: Negative for color change and rash  Neurological: Positive for headaches  Negative for seizures and syncope  All other systems reviewed and are negative  Physical Exam  Physical Exam  Vitals and nursing note reviewed  Constitutional:       General: She is not in acute distress  Appearance: She is well-developed  HENT:      Head: Normocephalic and atraumatic  Right Ear: External ear normal       Left Ear: External ear normal       Nose: Nose normal       Mouth/Throat:      Mouth: Mucous membranes are moist    Eyes:      Extraocular Movements: Extraocular movements intact  Conjunctiva/sclera: Conjunctivae normal       Pupils: Pupils are equal, round, and reactive to light  Cardiovascular:      Rate and Rhythm: Regular rhythm  Tachycardia present  Pulses: Normal pulses  Heart sounds: Normal heart sounds  No murmur heard  Pulmonary:      Effort: Pulmonary effort is normal  No respiratory distress  Breath sounds: Normal breath sounds  Abdominal:      General: Abdomen is flat  Palpations: Abdomen is soft  Tenderness: There is no abdominal tenderness  Musculoskeletal:         General: No swelling  Cervical back: Neck supple  Skin:     General: Skin is warm and dry  Capillary Refill: Capillary refill takes less than 2 seconds  Neurological:      General: No focal deficit present  Mental Status: She is alert  Psychiatric:         Mood and Affect: Mood normal          Thought Content:  Thought content normal          Judgment: Judgment normal          Vital Signs  ED Triage Vitals   Temperature Pulse Respirations Blood Pressure SpO2   11/21/22 1956 11/21/22 1956 11/21/22 1956 11/21/22 1958 11/21/22 1956   (!) 100 7 °F (38 2 °C) (!) 112 18 163/88 97 %      Temp Source Heart Rate Source Patient Position - Orthostatic VS BP Location FiO2 (%)   11/21/22 1956 11/21/22 1956 -- 11/21/22 1956 --   Oral Monitor  Left arm       Pain Score       11/21/22 2021       8           Vitals:    11/21/22 1956 11/21/22 1958   BP:  163/88   Pulse: (!) 112          Visual Acuity      ED Medications  Medications   dexamethasone (DECADRON) tablet 6 mg (6 mg Oral Given 11/21/22 2021)   acetaminophen (TYLENOL) tablet 650 mg (650 mg Oral Given 11/21/22 2021)       Diagnostic Studies  Results Reviewed     Procedure Component Value Units Date/Time    FLU/RSV/COVID - if FLU/RSV clinically relevant [[id number]]  (Abnormal) Collected: 11/21/22 2000    Lab Status: Final result Specimen: Nares from Nose Updated: 11/21/22 2104     SARS-CoV-2 Negative     INFLUENZA A PCR Positive     INFLUENZA B PCR Negative     RSV PCR Negative    Narrative:      FOR PEDIATRIC PATIENTS - copy/paste COVID Guidelines URL to browser: [email or web address]    SARS-CoV-2 assay is a Nucleic Acid Amplification assay intended for the  qualitative detection of nucleic acid from SARS-CoV-2 in nasopharyngeal  swabs  Results are for the presumptive identification of SARS-CoV-2 RNA  Positive results are indicative of infection with SARS-CoV-2, the virus  causing COVID-19, but do not rule out bacterial infection or co-infection  with other viruses  Laboratories within the [place] and its  territories are required to report all positive results to the appropriate  public health authorities  Negative results do not preclude SARS-CoV-2  infection and should not be used as the sole basis for treatment or other  patient management decisions  Negative results must be combined with  clinical observations, patient history, and epidemiological information  This test has not been FDA cleared or approved  This test has been authorized by FDA under an Emergency Use Authorization  (EUA)  This test is only authorized for the duration of time the  declaration that circumstances exist justifying the authorization of the  emergency use of an in vitro diagnostic tests for detection of SARS-CoV-2  virus and/or diagnosis of COVID-19 infection under section 564(b)(1) of  the Act, 21 U  S C  881GVW-9(S)(7), unless the authorization is terminated  or revoked sooner  The test has been validated but independent review by FDA  and CLIA is pending  Test performed using [employer] Gene[employer]pert: This RT-PCR assay targets N2,  a region unique to SARS-CoV-2  A conserved region in the E-gene was chosen  for pan-Sarbecovirus detection which includes SARS-CoV-2  According to CMS-2020-01-R, this platform meets the definition of high-TVSmiles technology                   XR chest 2 views   ED Interpretation by Nelida Kline MD (11/21 2038)   Nad Procedures  Procedures         ED Course  ED Course as of 11/21/22 2115   [place] Nov 21, 2022   2110 INFLU A PCR(!): Positive                               SBIRT 20yo+    Flowsheet Row Most Recent Value   SBIRT (23 yo +)    In order to provide better care to our patients, we are screening all of our patients for alcohol and drug use  Would it be okay to ask you these screening questions? No Filed at: 11/21/2022 2018                    [facility]  Number of Diagnoses or Management Options     Amount and/or Complexity of Data Reviewed  Clinical lab tests: ordered and reviewed  Tests in the radiology section of CPT®: ordered and reviewed  Independent visualization of images, tracings, or specimens: yes (NAD )    Risk of Complications, Morbidity, and/or Mortality  Presenting problems: moderate  Diagnostic procedures: moderate  Management options: moderate    Patient Progress  Patient progress: stable      Disposition  Final diagnoses:   Influenza     Time reflects when diagnosis was documented in both MDM as applicable and the Disposition within this note     Time User Action Codes Description Comment    11/21/2022  9:14 PM Figueroa Baker [J11 1] Influenza       ED Disposition     ED Disposition   Discharge    Condition   Stable    Date/Time   Mon Nov 21, 2022  9:14 PM    Cynthia Holly discharge to home/self care                 Follow-up Information     Follow up With Specialties Details Why [address], MD Internal Medicine In 3 days If symptoms worsen [facility]  [phone]            Patient's Medications   Discharge Prescriptions    BENZONATATE (TESSALON PERLES) 100 MG CAPSULE    Take 1 capsule (100 mg total) by mouth every 8 (eight) hours       Start Date: 11/21/2022End Date: --       Order Dose: 100 mg       Quantity: 21 capsule    Refills: 0    OSELTAMIVIR (TAMIFLU) 75 MG CAPSULE    Take 1 capsule (75 mg total) by mouth every 12 (twelve) hours for 5 days       Start Date: 11/21/2022End Date: 11/26/2022       Order Dose: 75 mg       Quantity: 10 capsule    Refills: 0       No discharge procedures on file      PDMP Review     None          ED Provider  Electronically Signed by           Alban Perez MD  11/21/22 9408

## 2023-03-27 ENCOUNTER — APPOINTMENT (OUTPATIENT)
Dept: RADIOLOGY | Facility: CLINIC | Age: 58
End: 2023-03-27

## 2023-03-27 ENCOUNTER — OFFICE VISIT (OUTPATIENT)
Dept: URGENT CARE | Facility: CLINIC | Age: 58
End: 2023-03-27

## 2023-03-27 VITALS
SYSTOLIC BLOOD PRESSURE: 186 MMHG | OXYGEN SATURATION: 98 % | HEART RATE: 95 BPM | WEIGHT: 215 LBS | DIASTOLIC BLOOD PRESSURE: 98 MMHG | RESPIRATION RATE: 17 BRPM | TEMPERATURE: 98.5 F | BODY MASS INDEX: 38.09 KG/M2

## 2023-03-27 DIAGNOSIS — R05.9 COUGH, UNSPECIFIED TYPE: Primary | ICD-10-CM

## 2023-03-27 DIAGNOSIS — R05.9 COUGH, UNSPECIFIED TYPE: ICD-10-CM

## 2023-03-27 DIAGNOSIS — J06.9 VIRAL URI WITH COUGH: ICD-10-CM

## 2023-03-27 NOTE — PROGRESS NOTES
"[address] Now        NAME: Dondra Fleischer is a 62 y o  female  : 1965    MRN: [id number]  DATE: 2023  TIME: 11:26 AM    Assessment and Plan   Cough, unspecified type [R05 9]  1  Cough, unspecified type  XR chest pa & lateral      2  Viral URI with cough              Patient Instructions   Chest xray reviewed and no acute pulmonary illness noted  On exam overall well-appearing, non toxic afebrile  Congested but lungs CTA and no increased work of breathing  Continue supportive treatment :Vitamin D3 2000 IU daily  Vitamin C 1000mg twice per day  Multivitamin daily  Some studies suggest that Zinc 12 5-15mg every 2 hours while awake x 5 days may shorten the duration cold symptoms by 1-2 days  Increase fluids and rest   Nasal saline spray; Afrin if severe congestion (do not use for more than 3 days)  Over the counter decongestant/cough suppressants  Tylenol/Ibuprofen for pain/fever  Salt water gargles and chloraseptic spray  Throat Coat Tea  Warm compresses over sinuses  Cool mist humidifier or vicks vaporizer  Follow up with PCP if symptoms do not improve or worsen      Follow up with PCP in 3-5 days  Proceed to  ER if symptoms worsen  Chief Complaint     Chief Complaint   Patient presents with   • Cold Like Symptoms     Pt c/o cough, lt sided ear pain, sore itchy throat, clogged nose, and watery eyes for 1 week  Did covid swab at home today- negative  History of Present Illness       HPI  This is a 62year old female here c/o intermittent sore throat, nasal congestion and headache since Wednesday  Rates the headache a 5/10- denies visual changes  Note notes she has a chronic cough but since Wednesday the cough has been \" excessive  \" Max temp of 99 last evening  Has asthma and has been using her Symbicort inhaler  Notes did have left ear pain but that has since resolved  Has taken osmel seltzer last night which helped  Denies shortness of breath, chest pain, n/v/d   Does note she has " a defibrillator  Took a negative covid test at home  Notes is not taking any of her regular medication because she left it at work     Review of Systems   Review of Systems      Current Medications       Current Outpatient Medications:   •  budesonide-formoterol (SYMBICORT) 160-4 5 mcg/act inhaler, Inhale 2 puffs 2 (two) times a day Rinse mouth after use , Disp: , Rfl:   •  chlorthalidone (HYGROTEN) 50 MG tablet, Take 50 mg by mouth daily, Disp: , Rfl:   •  aspirin 81 mg chewable tablet, Chew 1 tablet (81 mg total) daily (Patient not taking: Reported on 8/19/2019), Disp: 30 tablet, Rfl: 0  •  atorvastatin (LIPITOR) 40 mg tablet, Take 1 tablet (40 mg total) by mouth every evening, Disp: 30 tablet, Rfl: 0  •  benzonatate (TESSALON PERLES) 100 mg capsule, Take 1 capsule (100 mg total) by mouth every 8 (eight) hours (Patient not taking: Reported on 3/27/2023), Disp: 21 capsule, Rfl: 0  •  clopidogrel (PLAVIX) 75 mg tablet, Take 1 tablet (75 mg total) by mouth daily, Disp: 30 tablet, Rfl: 0  •  EPINEPHrine (EPIPEN) 0 3 mg/0 3 mL SOAJ, Inject 0 3 mL (0 3 mg total) into a muscle once for 1 dose, Disp: 0 6 mL, Rfl: 0  •  indomethacin (INDOCIN) 50 mg capsule, Take 1 capsule (50 mg total) by mouth 2 (two) times a day with meals (Patient not taking: Reported on 3/27/2023), Disp: 20 capsule, Rfl: 0  •  lisinopril (ZESTRIL) 10 mg tablet, Take 1 tablet (10 mg total) by mouth daily, Disp: 30 tablet, Rfl: 0  •  pantoprazole (PROTONIX) 40 mg tablet, Take 1 tablet (40 mg total) by mouth daily in the early morning, Disp: 30 tablet, Rfl: 0    Current Allergies     Allergies as of 03/27/2023 - Reviewed 03/27/2023   Allergen Reaction Noted   • Pickled meat Swelling 06/09/2021   • Shellfish-derived products - food allergy Swelling and Other (See Comments) 08/19/2021   • Coconut oil - food allergy Other (See Comments) 06/09/2021   • Kiwi extract - food allergy Other (See Comments) 06/09/2021   • Pineapple - food allergy Cough and Other (See Comments) 06/09/2021   • Pollen extract Cough and Other (See Comments) 08/19/2021   • Penicillins Rash 10/19/2020   • Propofol Rash and Other (See Comments) 01/22/2019            The following portions of the patient's history were reviewed and updated as appropriate: allergies, current medications, past family history, past medical history, past social history, past surgical history and problem list      Past Medical History:   Diagnosis Date   • Asthma    • Diabetes mellitus ([facility] [address] )    • Hypertension        Past Surgical History:   Procedure Laterality Date   • CHOLECYSTECTOMY     • GASTRIC BYPASS     • HERNIA REPAIR     • TUBAL LIGATION         Family History   Problem Relation Age of Onset   • Hypertension Mother    • Arthritis Mother    • Diabetes Father    • Hypertension Father    • Diabetes Sister    • Hypertension Sister    • Diabetes Brother    • Hypertension Brother    • Hypertension Sister    • Diabetes Sister          Medications have been verified  Objective   BP (!) 186/98 Comment: did not take BP meds today- provider notified  Pulse 95   Temp 98 5 °F (36 9 °C)   Resp 17   Wt 97 5 kg (215 lb)   SpO2 98%   BMI 38 09 kg/m²        Physical Exam     Physical Exam  Vitals and nursing note reviewed  Constitutional:       General: She is not in acute distress  Appearance: Normal appearance  She is normal weight  She is not ill-appearing or toxic-appearing  HENT:      Right Ear: Tympanic membrane, ear canal and external ear normal       Left Ear: Tympanic membrane, ear canal and external ear normal       Nose: Congestion present  No rhinorrhea  Mouth/Throat:      Mouth: Mucous membranes are moist       Pharynx: No oropharyngeal exudate or posterior oropharyngeal erythema  Cardiovascular:      Rate and Rhythm: Normal rate and regular rhythm  Pulmonary:      Effort: Pulmonary effort is normal  No respiratory distress  Breath sounds: Normal breath sounds   No wheezing, rhonchi or rales    Neurological:      Mental Status: She is alert

## 2023-03-27 NOTE — LETTER
March 27, 2023     Patient: Yulisa Sherman   YOB: 1965   Date of Visit: 3/27/2023       To Whom it May Concern:    Lubna Padgett Courser was seen in my clinic on 3/27/2023  If you have any questions or concerns, please don't hesitate to call           Sincerely,          Ann Ewing PA-C        CC: No Recipients

## 2023-09-29 ENCOUNTER — OFFICE VISIT (OUTPATIENT)
Dept: URGENT CARE | Facility: CLINIC | Age: 58
End: 2023-09-29
Payer: COMMERCIAL

## 2023-09-29 ENCOUNTER — HOSPITAL ENCOUNTER (EMERGENCY)
Facility: HOSPITAL | Age: 58
Discharge: HOME | End: 2023-09-29
Attending: EMERGENCY MEDICINE
Payer: COMMERCIAL

## 2023-09-29 ENCOUNTER — APPOINTMENT (EMERGENCY)
Dept: CT IMAGING | Facility: HOSPITAL | Age: 58
End: 2023-09-29
Payer: COMMERCIAL

## 2023-09-29 VITALS
BODY MASS INDEX: 34.22 KG/M2 | HEART RATE: 90 BPM | DIASTOLIC BLOOD PRESSURE: 72 MMHG | TEMPERATURE: 98.2 F | OXYGEN SATURATION: 100 % | HEIGHT: 63 IN | SYSTOLIC BLOOD PRESSURE: 113 MMHG | RESPIRATION RATE: 17 BRPM

## 2023-09-29 VITALS
BODY MASS INDEX: 34.22 KG/M2 | OXYGEN SATURATION: 99 % | HEART RATE: 97 BPM | WEIGHT: 193.2 LBS | TEMPERATURE: 98.6 F | RESPIRATION RATE: 20 BRPM

## 2023-09-29 DIAGNOSIS — R11.10 VOMITING: ICD-10-CM

## 2023-09-29 DIAGNOSIS — K29.70 GASTRITIS: Primary | ICD-10-CM

## 2023-09-29 DIAGNOSIS — R10.84 GENERALIZED ABDOMINAL PAIN: Primary | ICD-10-CM

## 2023-09-29 LAB
ALBUMIN SERPL BCP-MCNC: 4.1 G/DL (ref 3.5–5)
ALP SERPL-CCNC: 87 U/L (ref 34–104)
ALT SERPL W P-5'-P-CCNC: 15 U/L (ref 7–52)
ANION GAP SERPL CALCULATED.3IONS-SCNC: 8 MMOL/L
AST SERPL W P-5'-P-CCNC: 19 U/L (ref 13–39)
BASOPHILS # BLD AUTO: 0.03 THOUSANDS/ÂΜL (ref 0–0.1)
BASOPHILS NFR BLD AUTO: 1 % (ref 0–1)
BILIRUB SERPL-MCNC: 0.59 MG/DL (ref 0.2–1)
BUN SERPL-MCNC: 23 MG/DL (ref 5–25)
CALCIUM SERPL-MCNC: 9.6 MG/DL (ref 8.4–10.2)
CHLORIDE SERPL-SCNC: 98 MMOL/L (ref 96–108)
CO2 SERPL-SCNC: 31 MMOL/L (ref 21–32)
CREAT SERPL-MCNC: 1.09 MG/DL (ref 0.6–1.3)
EOSINOPHIL # BLD AUTO: 0.05 THOUSAND/ÂΜL (ref 0–0.61)
EOSINOPHIL NFR BLD AUTO: 1 % (ref 0–6)
ERYTHROCYTE [DISTWIDTH] IN BLOOD BY AUTOMATED COUNT: 14.3 % (ref 11.6–15.1)
GFR SERPL CREATININE-BSD FRML MDRD: 56 ML/MIN/1.73SQ M
GLUCOSE SERPL-MCNC: 103 MG/DL (ref 65–140)
HCT VFR BLD AUTO: 38.6 % (ref 34.8–46.1)
HGB BLD-MCNC: 12.7 G/DL (ref 11.5–15.4)
IMM GRANULOCYTES # BLD AUTO: 0.01 THOUSAND/UL (ref 0–0.2)
IMM GRANULOCYTES NFR BLD AUTO: 0 % (ref 0–2)
LIPASE SERPL-CCNC: 88 U/L (ref 11–82)
LYMPHOCYTES # BLD AUTO: 2.16 THOUSANDS/ÂΜL (ref 0.6–4.47)
LYMPHOCYTES NFR BLD AUTO: 33 % (ref 14–44)
MCH RBC QN AUTO: 27.3 PG (ref 26.8–34.3)
MCHC RBC AUTO-ENTMCNC: 32.9 G/DL (ref 31.4–37.4)
MCV RBC AUTO: 83 FL (ref 82–98)
MONOCYTES # BLD AUTO: 0.61 THOUSAND/ÂΜL (ref 0.17–1.22)
MONOCYTES NFR BLD AUTO: 9 % (ref 4–12)
NEUTROPHILS # BLD AUTO: 3.7 THOUSANDS/ÂΜL (ref 1.85–7.62)
NEUTS SEG NFR BLD AUTO: 56 % (ref 43–75)
NRBC BLD AUTO-RTO: 0 /100 WBCS
PLATELET # BLD AUTO: 246 THOUSANDS/UL (ref 149–390)
PMV BLD AUTO: 11.3 FL (ref 8.9–12.7)
POTASSIUM SERPL-SCNC: 3.1 MMOL/L (ref 3.5–5.3)
PROT SERPL-MCNC: 7.1 G/DL (ref 6.4–8.4)
RBC # BLD AUTO: 4.66 MILLION/UL (ref 3.81–5.12)
SODIUM SERPL-SCNC: 137 MMOL/L (ref 135–147)
WBC # BLD AUTO: 6.56 THOUSAND/UL (ref 4.31–10.16)

## 2023-09-29 PROCEDURE — 74177 CT ABD & PELVIS W/CONTRAST: CPT

## 2023-09-29 PROCEDURE — 83690 ASSAY OF LIPASE: CPT | Performed by: EMERGENCY MEDICINE

## 2023-09-29 PROCEDURE — 99215 OFFICE O/P EST HI 40 MIN: CPT | Performed by: PHYSICIAN ASSISTANT

## 2023-09-29 PROCEDURE — 80053 COMPREHEN METABOLIC PANEL: CPT | Performed by: EMERGENCY MEDICINE

## 2023-09-29 PROCEDURE — 99285 EMERGENCY DEPT VISIT HI MDM: CPT | Performed by: EMERGENCY MEDICINE

## 2023-09-29 PROCEDURE — G1004 CDSM NDSC: HCPCS

## 2023-09-29 PROCEDURE — 36415 COLL VENOUS BLD VENIPUNCTURE: CPT | Performed by: EMERGENCY MEDICINE

## 2023-09-29 PROCEDURE — 99284 EMERGENCY DEPT VISIT MOD MDM: CPT

## 2023-09-29 PROCEDURE — 85025 COMPLETE CBC W/AUTO DIFF WBC: CPT | Performed by: EMERGENCY MEDICINE

## 2023-09-29 RX ORDER — ONDANSETRON 4 MG/1
4 TABLET, FILM COATED ORAL EVERY 6 HOURS
Qty: 20 TABLET | Refills: 0 | Status: SHIPPED | OUTPATIENT
Start: 2023-09-29

## 2023-09-29 RX ORDER — POTASSIUM CHLORIDE 20 MEQ/1
20 TABLET, EXTENDED RELEASE ORAL 2 TIMES DAILY
Qty: 20 TABLET | Refills: 0 | Status: SHIPPED | OUTPATIENT
Start: 2023-09-29

## 2023-09-29 RX ORDER — SEMAGLUTIDE 2.4 MG/.75ML
INJECTION, SOLUTION SUBCUTANEOUS
COMMUNITY
Start: 2023-09-07

## 2023-09-29 RX ORDER — POTASSIUM CHLORIDE 20 MEQ/1
40 TABLET, EXTENDED RELEASE ORAL ONCE
Status: COMPLETED | OUTPATIENT
Start: 2023-09-29 | End: 2023-09-29

## 2023-09-29 RX ORDER — ERGOCALCIFEROL 1.25 MG/1
50000 CAPSULE ORAL WEEKLY
COMMUNITY
Start: 2023-08-10

## 2023-09-29 RX ORDER — ONDANSETRON 2 MG/ML
4 INJECTION INTRAMUSCULAR; INTRAVENOUS ONCE
Status: COMPLETED | OUTPATIENT
Start: 2023-09-29 | End: 2023-09-29

## 2023-09-29 RX ORDER — DIPHENHYDRAMINE HYDROCHLORIDE 50 MG/ML
50 INJECTION INTRAMUSCULAR; INTRAVENOUS ONCE
Status: COMPLETED | OUTPATIENT
Start: 2023-09-29 | End: 2023-09-29

## 2023-09-29 RX ORDER — OMEPRAZOLE 20 MG/1
20 CAPSULE, DELAYED RELEASE ORAL DAILY
Qty: 30 CAPSULE | Refills: 0 | Status: SHIPPED | OUTPATIENT
Start: 2023-09-29

## 2023-09-29 RX ADMIN — POTASSIUM CHLORIDE 40 MEQ: 1500 TABLET, EXTENDED RELEASE ORAL at 16:59

## 2023-09-29 RX ADMIN — SODIUM CHLORIDE 1000 ML: 0.9 INJECTION, SOLUTION INTRAVENOUS at 14:46

## 2023-09-29 RX ADMIN — IOHEXOL 100 ML: 350 INJECTION, SOLUTION INTRAVENOUS at 15:42

## 2023-09-29 RX ADMIN — ONDANSETRON 4 MG: 2 INJECTION INTRAMUSCULAR; INTRAVENOUS at 14:46

## 2023-09-29 RX ADMIN — DIPHENHYDRAMINE HYDROCHLORIDE 50 MG: 50 INJECTION, SOLUTION INTRAMUSCULAR; INTRAVENOUS at 15:05

## 2023-09-29 NOTE — DISCHARGE INSTRUCTIONS
Return to ER Sunday for reassessment, unless symptoms completely resolved. A  personal message from Dr. Popeye Rivas,  Thank you so much for allowing me to care for you today. I pride myself in the care and attention I give all my patients. I hope you were a witness to this tonight. If for any reason your condition does not improve or worsens, or you have a question that was not answered during your visit you can feel free to text me on my personal phone #  # [phone]. I will answer to your message and continue your care past your emergency room visit. Please understand that although you are being discharged because your condition has been deemed stable and able to be managed on an outpatient setting. However your condition may worsen as part of the natural progression of the illness/condition, if this occurs please come back to the emergency department for a repeat evaluation.

## 2023-09-29 NOTE — PROGRESS NOTES
[place] [facility]        NAME: Stella Castillo is a 62 y.o. female  : 1965    MRN: [id number]  DATE: 2023  TIME: 12:49 PM      Assessment and Plan     Generalized abdominal pain [R10.84]  1. Generalized abdominal pain  Transfer to other facility        Note:   Sent to ED for evaluation. Possible infectious etiology. Patient Instructions   There are no Patient Instructions on file for this visit. Follow up with PCP in 3-5 days. Go to ER if symptoms worsen. Chief Complaint     Chief Complaint   Patient presents with   • Cold Like Symptoms     PT reported that yesterday she started having abdominal cramping, vomiting and polyuria. PT reported that she has never felt like that before PT reported that she had the symptoms all day yesterday. PT denied diarrhea, but said she did have mucous. PT denied taking anything for symptoms. PT reported that she is feeling slightly better today. PT reported making a [employer] dinner, which she has never had before, and about an hour later she felt horrible. History of Present Illness     Patient presents with abdominal pain, vomiting, and frequent urination x yesterday. She states the abdominal pain is epigastric and is a cramping/twisting sensation and was a 10/10 pain. Denies diarrhea, but has mucus in stool. She also has flank pain. Pain now is a 5/10 and is constant. Pain is better with leaning forward. Review of Systems     Review of Systems   Constitutional: Positive for appetite change. Negative for chills, fatigue and fever. HENT: Negative for congestion, ear pain, postnasal drip, rhinorrhea, sinus pressure, sinus pain and sore throat. Eyes: Negative for pain and visual disturbance. Respiratory: Negative for cough, chest tightness and shortness of breath. Cardiovascular: Negative for chest pain and palpitations. Gastrointestinal: Positive for abdominal pain and vomiting. Negative for diarrhea and nausea. Genitourinary: Positive for flank pain and frequency. Negative for dysuria and hematuria. Musculoskeletal: Negative for arthralgias, back pain and myalgias. Skin: Negative for rash. Neurological: Negative for dizziness, seizures, syncope, numbness and headaches. All other systems reviewed and are negative.         Current Medications       Current Outpatient Medications:   •  budesonide-formoterol (SYMBICORT) 160-4.5 mcg/act inhaler, Inhale 2 puffs 2 (two) times a day Rinse mouth after use., Disp: , Rfl:   •  chlorthalidone (HYGROTEN) 50 MG tablet, Take 50 mg by mouth daily, Disp: , Rfl:   •  ergocalciferol (VITAMIN D2) 50,000 units, Take 50,000 Units by mouth once a week, Disp: , Rfl:   •  Wegovy 2.4 MG/0.75ML, inject 2.4 milligram subcutaneously weekly, Disp: , Rfl:   •  EPINEPHrine (EPIPEN) 0.3 mg/0.3 mL SOAJ, Inject 0.3 mL (0.3 mg total) into a muscle once for 1 dose, Disp: 0.6 mL, Rfl: 0  •  lisinopril (ZESTRIL) 10 mg tablet, Take 1 tablet (10 mg total) by mouth daily, Disp: 30 tablet, Rfl: 0    Current Allergies     Allergies as of 09/29/2023 - Reviewed 09/29/2023   Allergen Reaction Noted   • Pickled meat Swelling 06/09/2021   • Shellfish allergy - food allergy Swelling 06/09/2021   • Shellfish-derived products - food allergy Swelling and Other (See Comments) 08/19/2021   • Bee pollen Cough 08/19/2021   • Coconut oil - food allergy Other (See Comments) 06/09/2021   • Kiwi extract - food allergy Other (See Comments) 06/09/2021   • Pineapple - food allergy Cough and Other (See Comments) 06/09/2021   • Pollen extract Cough and Other (See Comments) 08/19/2021   • Penicillins Rash 10/19/2020   • Propofol Rash and Other (See Comments) 01/22/2019              The following portions of the patient's history were reviewed and updated as appropriate: allergies, current medications, past family history, past medical history, past social history, past surgical history, and problem list.     Past Medical History: Diagnosis Date   • Asthma    • Diabetes mellitus ([address])    • Hypertension        Past Surgical History:   Procedure Laterality Date   • CHOLECYSTECTOMY     • GASTRIC BYPASS     • HERNIA REPAIR     • TUBAL LIGATION         Family History   Problem Relation Age of Onset   • Hypertension Mother    • Arthritis Mother    • Diabetes Father    • Hypertension Father    • Diabetes Sister    • Hypertension Sister    • Diabetes Brother    • Hypertension Brother    • Hypertension Sister    • Diabetes Sister          Medications have been verified. Objective     Pulse 97   Temp 98.6 °F (37 °C) (Tympanic)   Resp 20   Wt 87.6 kg (193 lb 3.2 oz)   SpO2 99%   BMI 34.22 kg/m²   No LMP recorded. Patient is postmenopausal.         Physical Exam     Physical Exam  Vitals and nursing note reviewed. Constitutional:       Appearance: Normal appearance. She is normal weight. She is not ill-appearing. HENT:      Head: Normocephalic and atraumatic. Nose: Nose normal.      Mouth/Throat:      Mouth: Mucous membranes are moist.      Pharynx: Oropharynx is clear. Cardiovascular:      Rate and Rhythm: Normal rate and regular rhythm. Pulses: Normal pulses. Heart sounds: Normal heart sounds. Pulmonary:      Effort: Pulmonary effort is normal.      Breath sounds: Normal breath sounds. Abdominal:      General: Abdomen is flat. Bowel sounds are normal.      Palpations: Abdomen is soft. Tenderness: There is abdominal tenderness in the epigastric area, left upper quadrant and left lower quadrant. There is no right CVA tenderness, left CVA tenderness, guarding or rebound. Negative signs include Hester's sign, Rovsing's sign, McBurney's sign, psoas sign and obturator sign. Skin:     General: Skin is warm and dry. Neurological:      General: No focal deficit present. Mental Status: She is alert and oriented to person, place, and time.    Psychiatric:         Mood and Affect: Mood normal.         Behavior: Behavior normal.

## 2023-09-29 NOTE — ED NOTES
Discharged reviewed with pt. Pt verbalized understanding and has no further questions at this time. Pt ambulatory off unit with steady gait.      Sandy Dalton RN  09/29/23 1445

## 2023-09-29 NOTE — ED PROVIDER NOTES
History  Chief Complaint   Patient presents with   • Abdominal Pain     Pt C/o abd pain that began yesterday, lower back pain, frequent urination, and vomiting (yesterday). Pt denies burning with urination or blood in urine      61-year-old female with acute onset of nausea and vomiting overnight was more than 6-8 episodes nonbloody vomitus no diarrhea. And some lower back pain. No history of colitis or diverticulitis. No fever no chills      History provided by:  Patient   used: No    Abdominal Pain  Pain location:  L flank  Associated symptoms: nausea and vomiting    Associated symptoms: no chest pain, no chills, no cough, no dysuria, no fever, no hematuria, no shortness of breath and no sore throat        Prior to Admission Medications   Prescriptions Last Dose Informant Patient Reported? Taking? EPINEPHrine (EPIPEN) 0.3 mg/0.3 mL SOAJ   No No   Sig: Inject 0.3 mL (0.3 mg total) into a muscle once for 1 dose   Wegovy 2.4 MG/0.75ML   Yes No   Sig: inject 2.4 milligram subcutaneously weekly   budesonide-formoterol (SYMBICORT) 160-4.5 mcg/act inhaler  Self Yes No   Sig: Inhale 2 puffs 2 (two) times a day Rinse mouth after use.    chlorthalidone (HYGROTEN) 50 MG tablet   Yes No   Sig: Take 50 mg by mouth daily   ergocalciferol (VITAMIN D2) 50,000 units   Yes No   Sig: Take 50,000 Units by mouth once a week   lisinopril (ZESTRIL) 10 mg tablet   No No   Sig: Take 1 tablet (10 mg total) by mouth daily      Facility-Administered Medications: None       Past Medical History:   Diagnosis Date   • Asthma    • Diabetes mellitus ([address])    • Hypertension        Past Surgical History:   Procedure Laterality Date   • CHOLECYSTECTOMY     • GASTRIC BYPASS     • HERNIA REPAIR     • TUBAL LIGATION         Family History   Problem Relation Age of Onset   • Hypertension Mother    • Arthritis Mother    • Diabetes Father    • Hypertension Father    • Diabetes Sister    • Hypertension Sister    • Diabetes Brother • Hypertension Brother    • Hypertension Sister    • Diabetes Sister      I have reviewed and agree with the history as documented. E-Cigarette/Vaping     E-Cigarette/Vaping Substances     Social History     Tobacco Use   • Smoking status: Never   • Smokeless tobacco: Never   Substance Use Topics   • Alcohol use: Not Currently     Comment: occ   • Drug use: No       Review of Systems   Constitutional: Negative for chills and fever. HENT: Negative for ear pain and sore throat. Eyes: Negative for pain and visual disturbance. Respiratory: Negative for cough and shortness of breath. Cardiovascular: Negative for chest pain and palpitations. Gastrointestinal: Positive for abdominal pain, nausea and vomiting. Genitourinary: Negative for dysuria and hematuria. Musculoskeletal: Negative for arthralgias and back pain. Skin: Negative for color change and rash. Neurological: Negative for seizures and syncope. All other systems reviewed and are negative. Physical Exam  Physical Exam  Vitals and nursing note reviewed. Constitutional:       General: She is not in acute distress. Appearance: She is well-developed and normal weight. HENT:      Head: Normocephalic and atraumatic. Mouth/Throat:      Mouth: Mucous membranes are moist.   Eyes:      Conjunctiva/sclera: Conjunctivae normal.   Cardiovascular:      Rate and Rhythm: Normal rate and regular rhythm. Heart sounds: No murmur heard. Pulmonary:      Effort: Pulmonary effort is normal. No respiratory distress. Breath sounds: Normal breath sounds. Abdominal:      General: Bowel sounds are normal.      Palpations: Abdomen is soft. Tenderness: There is abdominal tenderness in the left lower quadrant. There is no guarding or rebound. Negative signs include Hester's sign and Rovsing's sign. Hernia: No hernia is present. Musculoskeletal:         General: No swelling. Cervical back: Neck supple.    Skin: General: Skin is warm and dry. Capillary Refill: Capillary refill takes less than 2 seconds. Neurological:      General: No focal deficit present. Mental Status: She is alert and oriented to person, place, and time.    Psychiatric:         Mood and Affect: Mood normal.         Behavior: Behavior normal.         Vital Signs  ED Triage Vitals [09/29/23 1347]   Temperature Pulse Respirations Blood Pressure SpO2   98.2 °F (36.8 °C) 90 17 113/72 100 %      Temp Source Heart Rate Source Patient Position - Orthostatic VS BP Location FiO2 (%)   Temporal Monitor Lying Left arm --      Pain Score       6           Vitals:    09/29/23 1347   BP: 113/72   Pulse: 90   Patient Position - Orthostatic VS: Lying         Visual Acuity      ED Medications  Medications   potassium chloride (K-DUR,KLOR-CON) CR tablet 40 mEq (has no administration in time range)   sodium chloride 0.9 % bolus 1,000 mL (0 mL Intravenous Stopped 9/29/23 1654)   ondansetron (ZOFRAN) injection 4 mg (4 mg Intravenous Given 9/29/23 1446)   diphenhydrAMINE (BENADRYL) injection 50 mg (50 mg Intravenous Given 9/29/23 1505)   iohexol (OMNIPAQUE) 350 MG/ML injection (MULTI-DOSE) 100 mL (100 mL Intravenous Given 9/29/23 1542)       Diagnostic Studies  Results Reviewed     Procedure Component Value Units Date/Time    Comprehensive metabolic panel [[id number]]  (Abnormal) Collected: 09/29/23 1445    Lab Status: Final result Specimen: Blood from Arm, Right Updated: 09/29/23 1522     Sodium 137 mmol/L      Potassium 3.1 mmol/L      Chloride 98 mmol/L      CO2 31 mmol/L      ANION GAP 8 mmol/L      BUN 23 mg/dL      Creatinine 1.09 mg/dL      Glucose 103 mg/dL      Calcium 9.6 mg/dL      AST 19 U/L      ALT 15 U/L      Alkaline Phosphatase 87 U/L      Total Protein 7.1 g/dL      Albumin 4.1 g/dL      Total Bilirubin 0.59 mg/dL      eGFR 56 ml/min/1.73sq m     Narrative:      [facility] guidelines for Chronic Kidney Disease (CKD):   • Stage 1 with normal or high GFR (GFR > 90 mL/min/1.73 square meters)  •  Stage 2 Mild CKD (GFR = 60-89 mL/min/1.73 square meters)  •  Stage 3A Moderate CKD (GFR = 45-59 mL/min/1.73 square meters)  •  Stage 3B Moderate CKD (GFR = 30-44 mL/min/1.73 square meters)  •  Stage 4 Severe CKD (GFR = 15-29 mL/min/1.73 square meters)  •  Stage 5 End Stage CKD (GFR <15 mL/min/1.73 square meters)  Note: GFR calculation is accurate only with a steady state creatinine    Lipase [[id number]]  (Abnormal) Collected: 09/29/23 1445    Lab Status: Final result Specimen: Blood from Arm, Right Updated: 09/29/23 1522     Lipase 88 u/L     CBC and differential [[id number]] Collected: 09/29/23 1445    Lab Status: Final result Specimen: Blood from Arm, Right Updated: 09/29/23 1454     WBC 6.56 Thousand/uL      RBC 4.66 Million/uL      Hemoglobin 12.7 g/dL      Hematocrit 38.6 %      MCV 83 fL      MCH 27.3 pg      MCHC 32.9 g/dL      RDW 14.3 %      MPV 11.3 fL      Platelets 551 Thousands/uL      nRBC 0 /100 WBCs      Neutrophils Relative 56 %      Immat GRANS % 0 %      Lymphocytes Relative 33 %      Monocytes Relative 9 %      Eosinophils Relative 1 %      Basophils Relative 1 %      Neutrophils Absolute 3.70 Thousands/µL      Immature Grans Absolute 0.01 Thousand/uL      Lymphocytes Absolute 2.16 Thousands/µL      Monocytes Absolute 0.61 Thousand/µL      Eosinophils Absolute 0.05 Thousand/µL      Basophils Absolute 0.03 Thousands/µL     UA w Reflex to Microscopic w Reflex to Culture [[id number]]     Lab Status: No result Specimen: Urine, Clean Catch                  CT abdomen pelvis with contrast   Final Result by Bassam Riley MD (09/29 1628)         1. Mildly distended small bowel loops in the left upper quadrant in the setting of gastric bypass surgery with associated small ascites in the left upper quadrant adjacent to the splenic flexure as well as moderate pelvic ascites.    No oral contrast was given to allow evaluation of the stomach and small bowel loops and assess for potential complications of gastric bypass versus colitis. Repeat scan with oral contrast is recommended. The study was marked in [facility] for immediate notification. Workstation performed: [id number]                    Procedures  Procedures         ED Course  ED Course as of 09/29/23 1655   Fri Sep 29, 2023   1647 WBC: 6.56   1647 Hemoglobin: 12.7   1647 Lipase(!): 88   1647 BUN: 23   1647 Creatinine: 1.09   1647 Potassium(!): 3.1                               SBIRT 20yo+    Flowsheet Row Most Recent Value   Initial Alcohol Screen: US AUDIT-C     1. How often do you have a drink containing alcohol? 0 Filed at: 09/29/2023 1347   2. How many drinks containing alcohol do you have on a typical day you are drinking? 0 Filed at: 09/29/2023 1347   3a. Male UNDER 65: How often do you have five or more drinks on one occasion? 0 Filed at: 09/29/2023 1347   3b. FEMALE Any Age, or MALE 65+: How often do you have 4 or more drinks on one occassion? 0 Filed at: 09/29/2023 1347   Audit-C Score 0 Filed at: 09/29/2023 1347   DILLAN: How many times in the past year have you. .. Used an illegal drug or used a prescription medication for non-medical reasons? Never Filed at: 09/29/2023 1347                    Medical Decision Making  . I have considered a broad diagnosis for abdominal pain that includes: Appendicitis, diverticulitis, colitis, cholecystitis, biliary colic, volvulus, small bowel obstruction, ileus, UTI, and other abdominal pathology. Given the patient's physical exam and work-up today, there is low although not zero suspicion for these diagnoses. Patient was advised and given appropriate return precautions, including continued or new fever, persistent vomiting, worsening abdominal pain, or any other concerning signs or symptoms especially if there are new. Abdomen nondistended. No focal pain to palpation.   Patient has not vomited while in the emergency department and had a whole liter of IV fluids. CT scan suggest focal colitis versus cannot rule out a bowel obstruction or complication of gastric bypass. However clinically patient is much better. Abdomen is nondistended. She has normal bowel sounds. Gastritis: acute illness or injury  Vomiting: acute illness or injury  Amount and/or Complexity of Data Reviewed  Labs: ordered. Decision-making details documented in ED Course. Radiology: ordered. Risk  OTC drugs. Prescription drug management. Disposition  Final diagnoses:   Gastritis   Vomiting     Time reflects when diagnosis was documented in both MDM as applicable and the Disposition within this note     Time User Action Codes Description Comment    9/29/2023  4:53 PM Trav Garcia [K29.70] Gastritis     9/29/2023  4:53 PM Trav Garcia [R11.10] Vomiting       ED Disposition     ED Disposition   Discharge    Condition   Stable    Date/Time   Fri Sep 29, 2023  4:53 PM    Comment   Artemisa June discharge to home/self care. Follow-up Information    None         Patient's Medications   Discharge Prescriptions    OMEPRAZOLE (PRILOSEC) 20 MG DELAYED RELEASE CAPSULE    Take 1 capsule (20 mg total) by mouth daily       Start Date: 9/29/2023 End Date: --       Order Dose: 20 mg       Quantity: 30 capsule    Refills: 0    ONDANSETRON (ZOFRAN) 4 MG TABLET    Take 1 tablet (4 mg total) by mouth every 6 (six) hours       Start Date: 9/29/2023 End Date: --       Order Dose: 4 mg       Quantity: 20 tablet    Refills: 0    POTASSIUM CHLORIDE (K-DUR,KLOR-CON) 20 MEQ TABLET    Take 1 tablet (20 mEq total) by mouth 2 (two) times a day       Start Date: 9/29/2023 End Date: --       Order Dose: 20 mEq       Quantity: 20 tablet    Refills: 0       No discharge procedures on file.     PDMP Review     None          ED Provider  Electronically Signed by           Humberto Ambrose MD  09/29/23 8493

## 2023-09-29 NOTE — ED NOTES
Patient reports wheals on bilateral arms from tourniquet while this RN inserted peripheral IV, reports itchiness denies SOB, no distress noted, provider made aware and will order benadryl, allergy list will be updated.      Ramon East RN  09/29/23 7885

## 2024-10-07 ENCOUNTER — OFFICE VISIT (OUTPATIENT)
Dept: URGENT CARE | Facility: CLINIC | Age: 59
End: 2024-10-07
Payer: COMMERCIAL

## 2024-10-07 ENCOUNTER — APPOINTMENT (OUTPATIENT)
Dept: RADIOLOGY | Facility: CLINIC | Age: 59
End: 2024-10-07
Payer: COMMERCIAL

## 2024-10-07 VITALS
DIASTOLIC BLOOD PRESSURE: 78 MMHG | BODY MASS INDEX: 36.85 KG/M2 | OXYGEN SATURATION: 100 % | SYSTOLIC BLOOD PRESSURE: 134 MMHG | TEMPERATURE: 97.6 F | WEIGHT: 208 LBS | RESPIRATION RATE: 18 BRPM | HEART RATE: 76 BPM

## 2024-10-07 DIAGNOSIS — M79.672 PAIN IN LEFT FOOT: ICD-10-CM

## 2024-10-07 DIAGNOSIS — M77.32 HEEL SPUR, LEFT: Primary | ICD-10-CM

## 2024-10-07 DIAGNOSIS — J06.9 VIRAL URI: ICD-10-CM

## 2024-10-07 PROCEDURE — 99214 OFFICE O/P EST MOD 30 MIN: CPT | Performed by: PHYSICIAN ASSISTANT

## 2024-10-07 PROCEDURE — 73630 X-RAY EXAM OF FOOT: CPT

## 2024-10-07 PROCEDURE — 87636 SARSCOV2 & INF A&B AMP PRB: CPT | Performed by: PHYSICIAN ASSISTANT

## 2024-10-07 NOTE — PROGRESS NOTES
[facility]        NAME: Lubna Edmond is a 59 y.o. female  : 1965    MRN: [id number]  DATE: 2024  TIME: 3:34 PM    Assessment and Plan   Heel spur, left [M77.32]  1. Heel spur, left  Ambulatory Referral to Podiatry      2. Pain in left foot  XR foot 3+ vw left      3. Viral URI  Covid/Flu- Office Collect Normal            Patient Instructions     Patient Instructions   X-ray of the left foot provider read.  Shows heel spur.    Recommended referral to podiatry.  Patient to use over-the-counter pain medicine as needed.    Advised that her cold-like symptoms are probably probably related to a viral upper respiratory infection. Will do PCR for COVID/Flu.  Take over the counter medications as needed for symptomatic management.     Follow up with PCP in 3-5 days.  Proceed to  ER if symptoms worsen.    If tests are performed, our office will contact you with results only if changes need to made to the care plan discussed with you at the visit. You can review your full results on [facility] Mychart.        Chief Complaint     Chief Complaint   Patient presents with    Cold Like Symptoms     Pt c/o chills, stuffy nose and felt feverish since yesterday.     Pain     Pt c/o left foot heel pain for almost 2 weeks when she applies pressure on heel to walk.  Pain 9/10, and has tried pain killers and icy hot.          History of Present Illness       Patient presents today for evaluation of left foot pain.  She states she has had the pain for about 2 weeks.  It hurts a lot when she walks and puts weight on her left foot.  Most of the pain is at the bottom of her heel.  She states it is about a 9 out of 10.  She has been trying over-the-counter painkillers and IcyHot but is getting no relief.  She also states that since yesterday she has felt feverish, but she has not taken her temperature.  She has chills, and a stuffy nose.    URI   This is a new problem. The current episode started yesterday. The  problem has been unchanged. There has been no fever. Associated symptoms include congestion. Pertinent negatives include no coughing, ear pain, headaches, rhinorrhea, sinus pain or sore throat.       Review of Systems   Review of Systems   Constitutional:  Positive for chills. Negative for fatigue and fever.   HENT:  Positive for congestion. Negative for ear pain, rhinorrhea, sinus pain and sore throat.    Respiratory:  Negative for cough.    Neurological:  Negative for headaches.   All other systems reviewed and are negative.        Current Medications       Current Outpatient Medications:     chlorthalidone (HYGROTEN) 50 MG tablet, Take 50 mg by mouth daily, Disp: , Rfl:     ergocalciferol (VITAMIN D2) 50,000 units, Take 50,000 Units by mouth once a week, Disp: , Rfl:     omeprazole (PriLOSEC) 20 mg delayed release capsule, Take 1 capsule (20 mg total) by mouth daily, Disp: 30 capsule, Rfl: 0    ondansetron (ZOFRAN) 4 mg tablet, Take 1 tablet (4 mg total) by mouth every 6 (six) hours, Disp: 20 tablet, Rfl: 0    potassium chloride (K-DUR,KLOR-CON) 20 mEq tablet, Take 1 tablet (20 mEq total) by mouth 2 (two) times a day, Disp: 20 tablet, Rfl: 0    Wegovy 2.4 MG/0.75ML, inject 2.4 milligram subcutaneously weekly, Disp: , Rfl:     budesonide-formoterol (SYMBICORT) 160-4.5 mcg/act inhaler, Inhale 2 puffs 2 (two) times a day Rinse mouth after use. (Patient not taking: Reported on 10/7/2024), Disp: , Rfl:     EPINEPHrine (EPIPEN) 0.3 mg/0.3 mL SOAJ, Inject 0.3 mL (0.3 mg total) into a muscle once for 1 dose, Disp: 0.6 mL, Rfl: 0    lisinopril (ZESTRIL) 10 mg tablet, Take 1 tablet (10 mg total) by mouth daily, Disp: 30 tablet, Rfl: 0    Current Allergies     Allergies as of 10/07/2024 - Reviewed 10/07/2024   Allergen Reaction Noted    Pickled meat Swelling 06/09/2021    Shellfish allergy - food allergy Swelling 06/09/2021    Shellfish-derived products - food allergy Swelling and Other (See Comments) 08/19/2021    Bee  pollen Cough 08/19/2021    Coconut oil - food allergy Other (See Comments) 06/09/2021    Kiwi extract - food allergy Other (See Comments) 06/09/2021    Pineapple - food allergy Cough and Other (See Comments) 06/09/2021    Pollen extract Cough and Other (See Comments) 08/19/2021    Penicillins Rash 10/19/2020    Propofol Rash and Other (See Comments) 01/22/2019            The following portions of the patient's history were reviewed and updated as appropriate: allergies, current medications, past family history, past medical history, past social history, past surgical history and problem list.     Past Medical History:   Diagnosis Date    Asthma     Diabetes mellitus (HCC)     Hypertension        Past Surgical History:   Procedure Laterality Date    CHOLECYSTECTOMY      GASTRIC BYPASS      HERNIA REPAIR      TUBAL LIGATION         Family History   Problem Relation Age of Onset    Hypertension Mother     Arthritis Mother     Diabetes Father     Hypertension Father     Diabetes Sister     Hypertension Sister     Diabetes Brother     Hypertension Brother     Hypertension Sister     Diabetes Sister          Medications have been verified.        Objective   /78   Pulse 76   Temp 97.6 °F (36.4 °C)   Resp 18   Wt 94.3 kg (208 lb)   SpO2 100%   BMI 36.85 kg/m²        Physical Exam     Physical Exam  Vitals and nursing note reviewed.   Constitutional:       Appearance: Normal appearance.   HENT:      Right Ear: Tympanic membrane, ear canal and external ear normal.      Left Ear: Tympanic membrane, ear canal and external ear normal.      Nose: Congestion present.      Mouth/Throat:      Mouth: Mucous membranes are moist.   Cardiovascular:      Rate and Rhythm: Normal rate and regular rhythm.   Pulmonary:      Effort: Pulmonary effort is normal.      Breath sounds: Normal breath sounds.   Musculoskeletal:      Left foot: Normal range of motion. Bony tenderness (at the calcaneus) present. No swelling or tenderness.  Normal pulse.   Skin:     General: Skin is warm and dry.   Neurological:      General: No focal deficit present.      Mental Status: She is alert and oriented to person, place, and time.   Psychiatric:         Mood and Affect: Mood normal.         Behavior: Behavior normal.

## 2024-10-07 NOTE — PATIENT INSTRUCTIONS
X-ray of the left foot provider read.  Shows heel spur.    Recommended referral to podiatry.  Patient to use over-the-counter pain medicine as needed.    Advised that her cold-like symptoms are probably probably related to a viral upper respiratory infection. Will do PCR for COVID/Flu.  Take over the counter medications as needed for symptomatic management.     Follow up with PCP in 3-5 days.  Proceed to  ER if symptoms worsen.    If tests are performed, our office will contact you with results only if changes need to made to the care plan discussed with you at the visit. You can review your full results on [facility] Mychart.

## 2024-10-08 LAB
FLUAV RNA RESP QL NAA+PROBE: NEGATIVE
FLUBV RNA RESP QL NAA+PROBE: NEGATIVE
SARS-COV-2 RNA RESP QL NAA+PROBE: NEGATIVE

## 2024-11-04 ENCOUNTER — HOSPITAL ENCOUNTER (EMERGENCY)
Facility: HOSPITAL | Age: 59
Discharge: HOME | End: 2024-11-04
Attending: EMERGENCY MEDICINE
Payer: COMMERCIAL

## 2024-11-04 ENCOUNTER — APPOINTMENT (EMERGENCY)
Dept: RADIOLOGY | Facility: HOSPITAL | Age: 59
End: 2024-11-04
Payer: COMMERCIAL

## 2024-11-04 VITALS
WEIGHT: 226.85 LBS | TEMPERATURE: 98.2 F | HEIGHT: 63 IN | RESPIRATION RATE: 18 BRPM | BODY MASS INDEX: 40.2 KG/M2 | OXYGEN SATURATION: 100 % | DIASTOLIC BLOOD PRESSURE: 85 MMHG | SYSTOLIC BLOOD PRESSURE: 166 MMHG | HEART RATE: 67 BPM

## 2024-11-04 DIAGNOSIS — G56.00 CARPAL TUNNEL SYNDROME: ICD-10-CM

## 2024-11-04 DIAGNOSIS — R60.0 PERIPHERAL EDEMA: Primary | ICD-10-CM

## 2024-11-04 DIAGNOSIS — I10 HYPERTENSION: ICD-10-CM

## 2024-11-04 LAB
ANION GAP SERPL CALCULATED.3IONS-SCNC: 8 MMOL/L (ref 4–13)
ATRIAL RATE: 75 BPM
BASOPHILS # BLD AUTO: 0.04 THOUSANDS/ΜL (ref 0–0.1)
BASOPHILS NFR BLD AUTO: 1 % (ref 0–1)
BNP SERPL-MCNC: 153 PG/ML (ref 0–100)
BUN SERPL-MCNC: 13 MG/DL (ref 5–25)
CALCIUM SERPL-MCNC: 8.9 MG/DL (ref 8.4–10.2)
CARDIAC TROPONIN I PNL SERPL HS: 4 NG/L
CHLORIDE SERPL-SCNC: 106 MMOL/L (ref 96–108)
CO2 SERPL-SCNC: 26 MMOL/L (ref 21–32)
CREAT SERPL-MCNC: 0.8 MG/DL (ref 0.6–1.3)
EOSINOPHIL # BLD AUTO: 0.17 THOUSAND/ΜL (ref 0–0.61)
EOSINOPHIL NFR BLD AUTO: 3 % (ref 0–6)
ERYTHROCYTE [DISTWIDTH] IN BLOOD BY AUTOMATED COUNT: 14.6 % (ref 11.6–15.1)
GFR SERPL CREATININE-BSD FRML MDRD: 80 ML/MIN/1.73SQ M
GLUCOSE SERPL-MCNC: 116 MG/DL (ref 65–140)
HCT VFR BLD AUTO: 40.2 % (ref 34.8–46.1)
HGB BLD-MCNC: 12.4 G/DL (ref 11.5–15.4)
IMM GRANULOCYTES # BLD AUTO: 0.02 THOUSAND/UL (ref 0–0.2)
IMM GRANULOCYTES NFR BLD AUTO: 0 % (ref 0–2)
LYMPHOCYTES # BLD AUTO: 1.87 THOUSANDS/ΜL (ref 0.6–4.47)
LYMPHOCYTES NFR BLD AUTO: 34 % (ref 14–44)
MCH RBC QN AUTO: 26.4 PG (ref 26.8–34.3)
MCHC RBC AUTO-ENTMCNC: 30.8 G/DL (ref 31.4–37.4)
MCV RBC AUTO: 86 FL (ref 82–98)
MONOCYTES # BLD AUTO: 0.43 THOUSAND/ΜL (ref 0.17–1.22)
MONOCYTES NFR BLD AUTO: 8 % (ref 4–12)
NEUTROPHILS # BLD AUTO: 3.04 THOUSANDS/ΜL (ref 1.85–7.62)
NEUTS SEG NFR BLD AUTO: 55 % (ref 43–75)
NRBC BLD AUTO-RTO: 0 /100 WBCS
P AXIS: 60 DEGREES
PLATELET # BLD AUTO: 232 THOUSANDS/UL (ref 149–390)
PMV BLD AUTO: 11.5 FL (ref 8.9–12.7)
POTASSIUM SERPL-SCNC: 4 MMOL/L (ref 3.5–5.3)
PR INTERVAL: 170 MS
QRS AXIS: 11 DEGREES
QRSD INTERVAL: 78 MS
QT INTERVAL: 406 MS
QTC INTERVAL: 453 MS
RBC # BLD AUTO: 4.7 MILLION/UL (ref 3.81–5.12)
SODIUM SERPL-SCNC: 140 MMOL/L (ref 135–147)
T WAVE AXIS: 10 DEGREES
VENTRICULAR RATE: 75 BPM
WBC # BLD AUTO: 5.57 THOUSAND/UL (ref 4.31–10.16)

## 2024-11-04 PROCEDURE — 84484 ASSAY OF TROPONIN QUANT: CPT | Performed by: EMERGENCY MEDICINE

## 2024-11-04 PROCEDURE — 85025 COMPLETE CBC W/AUTO DIFF WBC: CPT | Performed by: EMERGENCY MEDICINE

## 2024-11-04 PROCEDURE — 93005 ELECTROCARDIOGRAM TRACING: CPT

## 2024-11-04 PROCEDURE — 80048 BASIC METABOLIC PNL TOTAL CA: CPT | Performed by: EMERGENCY MEDICINE

## 2024-11-04 PROCEDURE — 93010 ELECTROCARDIOGRAM REPORT: CPT | Performed by: STUDENT IN AN ORGANIZED HEALTH CARE EDUCATION/TRAINING PROGRAM

## 2024-11-04 PROCEDURE — 99284 EMERGENCY DEPT VISIT MOD MDM: CPT | Performed by: EMERGENCY MEDICINE

## 2024-11-04 PROCEDURE — 99283 EMERGENCY DEPT VISIT LOW MDM: CPT

## 2024-11-04 PROCEDURE — 36415 COLL VENOUS BLD VENIPUNCTURE: CPT | Performed by: EMERGENCY MEDICINE

## 2024-11-04 PROCEDURE — 71045 X-RAY EXAM CHEST 1 VIEW: CPT

## 2024-11-04 PROCEDURE — 83880 ASSAY OF NATRIURETIC PEPTIDE: CPT | Performed by: EMERGENCY MEDICINE

## 2024-11-04 RX ORDER — LISINOPRIL 10 MG/1
10 TABLET ORAL ONCE
Status: COMPLETED | OUTPATIENT
Start: 2024-11-04 | End: 2024-11-04

## 2024-11-04 RX ORDER — LISINOPRIL 10 MG/1
10 TABLET ORAL DAILY
Qty: 30 TABLET | Refills: 0 | Status: SHIPPED | OUTPATIENT
Start: 2024-11-04 | End: 2024-12-04

## 2024-11-04 RX ORDER — BUDESONIDE AND FORMOTEROL FUMARATE DIHYDRATE 80; 4.5 UG/1; UG/1
2 AEROSOL RESPIRATORY (INHALATION) 2 TIMES DAILY
Qty: 10.2 G | Refills: 0 | Status: SHIPPED | OUTPATIENT
Start: 2024-11-04

## 2024-11-04 RX ORDER — CHLORTHALIDONE 25 MG/1
25 TABLET ORAL DAILY
Qty: 30 TABLET | Refills: 0 | Status: SHIPPED | OUTPATIENT
Start: 2024-11-04 | End: 2024-12-04

## 2024-11-04 RX ORDER — CHLORTHALIDONE 25 MG/1
25 TABLET ORAL DAILY
Status: DISCONTINUED | OUTPATIENT
Start: 2024-11-04 | End: 2024-11-04 | Stop reason: HOSPADM

## 2024-11-04 RX ADMIN — LISINOPRIL 10 MG: 10 TABLET ORAL at 10:28

## 2024-11-04 RX ADMIN — CHLORTHALIDONE 25 MG: 25 TABLET ORAL at 10:28

## 2024-11-04 NOTE — ED PROVIDER NOTES
Time reflects when diagnosis was documented in both MDM as applicable and the Disposition within this note       Time User Action Codes Description Comment    11/4/2024 11:03 AM Jam Segundo [R60.0] Peripheral edema     11/4/2024 11:03 AM Jam Segundo [I10] Hypertension     11/4/2024 11:08 AM Jam Segundo [G56.00] Carpal tunnel syndrome           ED Disposition       ED Disposition   Discharge    Condition   Stable    Date/Time   Mon Nov 4, 2024 11:03 AM    Comment   Lubna Edmond discharge to home/self care.                   Assessment & Plan       Medical Decision Making  Amount and/or Complexity of Data Reviewed  Labs: ordered.  Radiology: ordered.    Risk  Prescription drug management.      Medical Decision Making 59-year-old female history of leg edema in the past on diuretics also history of hypertension presents emergency department reports hypertensive at home.  Patient denies any chest pain.  There is no acute shortness of breath.  Patient reports she believes her asthma has been acting up because she has been off her Symbicort.  She reports she is hypertensive because she is unable to get a refill of her blood pressure medicines.  She reports leg edema because she is unable to get a refill of her diuretics.  I gave the patient her daily medications her blood pressure immediately improved.  We discussed adding back her diuretic to improve her leg edema.  I wrote for Symbicort.  Patient later complained of some tingling in both hands primarily both palms worse when she would put the backs of her hands together consistent with carpal tunnel.  We discussed hand follow-up.  No indication for admission.  Presentation primarily due to the fact that the patient discontinued her medications because she was unable to get refills.  She reports her doctors in [place] and she has difficulty getting there.  I wrote prescriptions for medicines for 30 days and advised follow-up with her provider or with our  provider.       Medications   chlorthalidone tablet 25 mg (25 mg Oral Given 11/4/24 1028)   lisinopril (ZESTRIL) tablet 10 mg (10 mg Oral Given 11/4/24 1028)       ED Risk Strat Scores           Chest x-ray: Chest x-ray showed a slightly enlarged cardiac silhouette, no pneumothorax no infiltrates, some bilateral increased markings, interpreted by me, I was the primary .       Other diagnostic testing  Diagnostic testing, card troponin was 4.  Card troponin was done because the patient complained of edema.  The patient did not have chest pain.  BNP was 153 suggestive of some mild congestive heart failure  White count was normal at 5.5 no sign of inflammation hemoglobin was normal at 12 no sign of anemia.          SBIRT 20yo+      Flowsheet Row Most Recent Value   Initial Alcohol Screen: US AUDIT-C     1. How often do you have a drink containing alcohol? 0 Filed at: 11/04/2024 1002   2. How many drinks containing alcohol do you have on a typical day you are drinking?  0 Filed at: 11/04/2024 1002   3b. FEMALE Any Age, or MALE 65+: How often do you have 4 or more drinks on one occassion? 0 Filed at: 11/04/2024 1002   Audit-C Score 0 Filed at: 11/04/2024 1002   DILLAN: How many times in the past year have you...    Used an illegal drug or used a prescription medication for non-medical reasons? Never Filed at: 11/04/2024 1002                            History of Present Illness       Chief Complaint   Patient presents with    Leg Swelling     Bilateral leg swelling for 4 days, ran out of water pill and blood pressure medication.        Past Medical History:   Diagnosis Date    Asthma     Diabetes mellitus (HCC)     Hypertension       Past Surgical History:   Procedure Laterality Date    CHOLECYSTECTOMY      GASTRIC BYPASS      HERNIA REPAIR      TUBAL LIGATION        Family History   Problem Relation Age of Onset    Hypertension Mother     Arthritis Mother     Diabetes Father     Hypertension Father     Diabetes  Sister     Hypertension Sister     Diabetes Brother     Hypertension Brother     Hypertension Sister     Diabetes Sister       Social History     Tobacco Use    Smoking status: Never    Smokeless tobacco: Never   Vaping Use    Vaping status: Never Used   Substance Use Topics    Alcohol use: Not Currently     Comment: occ    Drug use: No      E-Cigarette/Vaping    E-Cigarette Use Never User       E-Cigarette/Vaping Substances      I have reviewed and agree with the history as documented.     HPI patient is a 59-year-old female with a history of hypertension on lisinopril and a diuretic.  Patient reports that she was unable to get her prescriptions refilled so she came to the emergency department.  She reports leg swelling.  She reports that she is also with out Symbicort so she believes her asthma is acting up.  She denies any acute shortness of breath.  There is no chest pain.  Denies any fever or chills.  Denies any vomiting.  Denies any acute shortness of breath.  She reports both of her legs are edematous because she stopped her diuretics.    Medical history of hypertension leg edema, asthma  Family history uncontributory  Social history, non-smoker no history of drug abuse    Review of Systems   Constitutional:  Negative for fever.   HENT:  Negative for congestion.    Eyes:  Negative for pain and redness.   Respiratory:  Negative for cough and shortness of breath.    Cardiovascular:  Positive for leg swelling. Negative for chest pain.   Gastrointestinal:  Negative for abdominal pain and vomiting.           Objective       ED Triage Vitals [11/04/24 0959]   Temperature Pulse Blood Pressure Respirations SpO2 Patient Position - Orthostatic VS   98.2 °F (36.8 °C) 80 (!) 233/99 18 100 % Sitting      Temp Source Heart Rate Source BP Location FiO2 (%) Pain Score    Tympanic Monitor Left arm -- --      Vitals      Date and Time Temp Pulse SpO2 Resp BP Pain Score FACES Pain Rating User   11/04/24 1030 -- 67 100 % 18  166/85 -- -- LM   11/04/24 1029 -- 71 100 % 16 166/85 -- -- LM   11/04/24 0959 98.2 °F (36.8 °C) 80 100 % 18 233/99 -- -- LA            Physical Exam  Vitals and nursing note reviewed.   Constitutional:       Appearance: She is well-developed.   HENT:      Head: Normocephalic.      Right Ear: External ear normal.      Left Ear: External ear normal.      Nose: Nose normal.      Mouth/Throat:      Mouth: Mucous membranes are moist.      Pharynx: Oropharynx is clear.   Eyes:      General: Lids are normal.      Extraocular Movements: Extraocular movements intact.      Pupils: Pupils are equal, round, and reactive to light.   Cardiovascular:      Rate and Rhythm: Normal rate and regular rhythm.      Pulses: Normal pulses.      Heart sounds: Normal heart sounds.   Pulmonary:      Effort: Pulmonary effort is normal. No respiratory distress.      Breath sounds: Normal breath sounds.   Abdominal:      General: Abdomen is flat. Bowel sounds are normal.   Musculoskeletal:         General: No deformity. Normal range of motion.      Cervical back: Normal range of motion and neck supple.      Right lower leg: Edema present.      Left lower leg: Edema present.   Skin:     General: Skin is warm and dry.   Neurological:      Mental Status: She is alert and oriented to person, place, and time.   Psychiatric:         Mood and Affect: Mood normal.         Results Reviewed       Procedure Component Value Units Date/Time    B-Type Natriuretic Peptide(BNP) [[id number]]  (Abnormal) Collected: 11/04/24 1017    Lab Status: Final result Specimen: Blood from Arm, Left Updated: 11/04/24 1108      pg/mL     HS Troponin I 2hr [[id number]]     Lab Status: No result Specimen: Blood     HS Troponin 0hr (reflex protocol) [[id number]]  (Normal) Collected: 11/04/24 1017    Lab Status: Final result Specimen: Blood from Arm, Left Updated: 11/04/24 1054     hs TnI 0hr 4 ng/L     Basic metabolic panel [[id number]] Collected: 11/04/24 1017    Lab Status:  Final result Specimen: Blood from Arm, Left Updated: 11/04/24 1049     Sodium 140 mmol/L      Potassium 4.0 mmol/L      Chloride 106 mmol/L      CO2 26 mmol/L      ANION GAP 8 mmol/L      BUN 13 mg/dL      Creatinine 0.80 mg/dL      Glucose 116 mg/dL      Calcium 8.9 mg/dL      eGFR 80 ml/min/1.73sq m     Narrative:      [employer] guidelines for Chronic Kidney Disease (CKD):     Stage 1 with normal or high GFR (GFR > 90 mL/min/1.73 square meters)    Stage 2 Mild CKD (GFR = 60-89 mL/min/1.73 square meters)    Stage 3A Moderate CKD (GFR = 45-59 mL/min/1.73 square meters)    Stage 3B Moderate CKD (GFR = 30-44 mL/min/1.73 square meters)    Stage 4 Severe CKD (GFR = 15-29 mL/min/1.73 square meters)    Stage 5 End Stage CKD (GFR <15 mL/min/1.73 square meters)  Note: GFR calculation is accurate only with a steady state creatinine    CBC and differential [[id number]]  (Abnormal) Collected: 11/04/24 1017    Lab Status: Final result Specimen: Blood from Arm, Left Updated: 11/04/24 1044     WBC 5.57 Thousand/uL      RBC 4.70 Million/uL      Hemoglobin 12.4 g/dL      Hematocrit 40.2 %      MCV 86 fL      MCH 26.4 pg      MCHC 30.8 g/dL      RDW 14.6 %      MPV 11.5 fL      Platelets 232 Thousands/uL      nRBC 0 /100 WBCs      Segmented % 55 %      Immature Grans % 0 %      Lymphocytes % 34 %      Monocytes % 8 %      Eosinophils Relative 3 %      Basophils Relative 1 %      Absolute Neutrophils 3.04 Thousands/µL      Absolute Immature Grans 0.02 Thousand/uL      Absolute Lymphocytes 1.87 Thousands/µL      Absolute Monocytes 0.43 Thousand/µL      Eosinophils Absolute 0.17 Thousand/µL      Basophils Absolute 0.04 Thousands/µL             XR chest 1 view portable   Final Interpretation by Jacob Duvall DO (11/04 1121)      Suspected early pulmonary vascular congestion.      The study was marked in EPIC for immediate notification.      Resident: Jason Quintero I, the attending radiologist, have  reviewed the images and agree with the final report above.      Workstation performed: [id number]             ECG 12 Lead Documentation Only    Date/Time: 11/4/2024 10:53 AM    Performed by: Jam Segundo MD  Authorized by: Jam Segundo MD    Indications / Diagnosis:  Leg swelling  ECG reviewed by me, the ED Provider: yes    Patient location:  ED  Previous ECG:     Previous ECG:  Compared to current    Comparison ECG info:  September 10, 2021    Similarity:  No change  Interpretation:     Interpretation: normal    Rate:     ECG rate:  75    ECG rate assessment: normal    Rhythm:     Rhythm: sinus rhythm    Comments:      Normal sinus rhythm no acute ST-T wave changes no ST elevations      ED Medication and Procedure Management   Prior to Admission Medications   Prescriptions Last Dose Informant Patient Reported? Taking?   EPINEPHrine (EPIPEN) 0.3 mg/0.3 mL SOAJ   No No   Sig: Inject 0.3 mL (0.3 mg total) into a muscle once for 1 dose   Wegovy 2.4 MG/0.75ML   Yes No   Sig: inject 2.4 milligram subcutaneously weekly   budesonide-formoterol (SYMBICORT) 160-4.5 mcg/act inhaler  Self Yes No   Sig: Inhale 2 puffs 2 (two) times a day Rinse mouth after use.   Patient not taking: Reported on 10/7/2024   chlorthalidone (HYGROTEN) 50 MG tablet   Yes No   Sig: Take 50 mg by mouth daily   ergocalciferol (VITAMIN D2) 50,000 units   Yes No   Sig: Take 50,000 Units by mouth once a week   lisinopril (ZESTRIL) 10 mg tablet   No No   Sig: Take 1 tablet (10 mg total) by mouth daily   omeprazole (PriLOSEC) 20 mg delayed release capsule   No No   Sig: Take 1 capsule (20 mg total) by mouth daily   ondansetron (ZOFRAN) 4 mg tablet   No No   Sig: Take 1 tablet (4 mg total) by mouth every 6 (six) hours   potassium chloride (K-DUR,KLOR-CON) 20 mEq tablet   No No   Sig: Take 1 tablet (20 mEq total) by mouth 2 (two) times a day      Facility-Administered Medications: None     Discharge Medication List as of 11/4/2024 11:08 AM        START  taking these medications    Details   budesonide-formoterol (Symbicort) 80-4.5 MCG/ACT inhaler Inhale 2 puffs 2 (two) times a day Rinse mouth after use., Starting Mon 11/4/2024, Normal      !! chlorthalidone 25 mg tablet Take 1 tablet (25 mg total) by mouth daily, Starting Mon 11/4/2024, Until Wed 12/4/2024, Normal       !! - Potential duplicate medications found. Please discuss with provider.        CONTINUE these medications which have CHANGED    Details   lisinopril (ZESTRIL) 10 mg tablet Take 1 tablet (10 mg total) by mouth daily, Starting Mon 11/4/2024, Until Wed 12/4/2024, Normal           CONTINUE these medications which have NOT CHANGED    Details   budesonide-formoterol (SYMBICORT) 160-4.5 mcg/act inhaler Inhale 2 puffs 2 (two) times a day Rinse mouth after use., Historical Med      !! chlorthalidone (HYGROTEN) 50 MG tablet Take 50 mg by mouth daily, Historical Med      EPINEPHrine (EPIPEN) 0.3 mg/0.3 mL SOAJ Inject 0.3 mL (0.3 mg total) into a muscle once for 1 dose, Starting Fri 9/10/2021, Normal      ergocalciferol (VITAMIN D2) 50,000 units Take 50,000 Units by mouth once a week, Starting Thu 8/10/2023, Historical Med      omeprazole (PriLOSEC) 20 mg delayed release capsule Take 1 capsule (20 mg total) by mouth daily, Starting Fri 9/29/2023, Normal      ondansetron (ZOFRAN) 4 mg tablet Take 1 tablet (4 mg total) by mouth every 6 (six) hours, Starting Fri 9/29/2023, Normal      potassium chloride (K-DUR,KLOR-CON) 20 mEq tablet Take 1 tablet (20 mEq total) by mouth 2 (two) times a day, Starting Fri 9/29/2023, Normal      Wegovy 2.4 MG/0.75ML inject 2.4 milligram subcutaneously weekly, Historical Med       !! - Potential duplicate medications found. Please discuss with provider.        No discharge procedures on file.  ED SEPSIS DOCUMENTATION   Time reflects when diagnosis was documented in both MDM as applicable and the Disposition within this note       Time User Action Codes Description Comment     11/4/2024 11:03 AM Jam Segundo [R60.0] Peripheral edema     11/4/2024 11:03 AM Jam Segundo [I10] Hypertension     11/4/2024 11:08 AM Jam Segundo [G56.00] Carpal tunnel syndrome                  Jam Segundo MD  11/04/24 1146

## 2024-11-04 NOTE — DISCHARGE INSTRUCTIONS
Restart your medications  Follow-up with the primary care provider or with our primary care provider

## 2025-08-22 ENCOUNTER — OFFICE VISIT (OUTPATIENT)
Age: 60
End: 2025-08-22
Payer: COMMERCIAL

## 2025-08-22 DIAGNOSIS — M25.512 CHRONIC PAIN OF BOTH SHOULDERS: ICD-10-CM

## 2025-08-22 DIAGNOSIS — M25.511 CHRONIC PAIN OF BOTH SHOULDERS: ICD-10-CM

## 2025-08-22 DIAGNOSIS — M79.672 PAIN IN LEFT FOOT: Primary | ICD-10-CM

## 2025-08-22 DIAGNOSIS — G89.29 CHRONIC PAIN OF BOTH SHOULDERS: ICD-10-CM

## 2025-08-22 PROCEDURE — 99203 OFFICE O/P NEW LOW 30 MIN: CPT | Performed by: ORTHOPAEDIC SURGERY
